# Patient Record
Sex: FEMALE | Race: WHITE | NOT HISPANIC OR LATINO | Employment: UNEMPLOYED | URBAN - METROPOLITAN AREA
[De-identification: names, ages, dates, MRNs, and addresses within clinical notes are randomized per-mention and may not be internally consistent; named-entity substitution may affect disease eponyms.]

---

## 2017-02-22 ENCOUNTER — APPOINTMENT (OUTPATIENT)
Dept: WOMENS IMAGING | Facility: HOSPITAL | Age: 47
End: 2017-02-22

## 2017-02-22 PROCEDURE — 77067 SCR MAMMO BI INCL CAD: CPT | Performed by: RADIOLOGY

## 2017-02-22 PROCEDURE — 77063 BREAST TOMOSYNTHESIS BI: CPT | Performed by: RADIOLOGY

## 2018-02-26 ENCOUNTER — APPOINTMENT (OUTPATIENT)
Dept: WOMENS IMAGING | Facility: HOSPITAL | Age: 48
End: 2018-02-26

## 2018-02-26 PROCEDURE — 77063 BREAST TOMOSYNTHESIS BI: CPT | Performed by: RADIOLOGY

## 2018-02-26 PROCEDURE — 77067 SCR MAMMO BI INCL CAD: CPT | Performed by: RADIOLOGY

## 2018-06-20 ENCOUNTER — APPOINTMENT (OUTPATIENT)
Dept: PREADMISSION TESTING | Facility: HOSPITAL | Age: 48
End: 2018-06-20

## 2018-06-20 VITALS
HEIGHT: 68 IN | OXYGEN SATURATION: 100 % | SYSTOLIC BLOOD PRESSURE: 117 MMHG | TEMPERATURE: 98.1 F | BODY MASS INDEX: 28.87 KG/M2 | RESPIRATION RATE: 20 BRPM | HEART RATE: 82 BPM | DIASTOLIC BLOOD PRESSURE: 78 MMHG | WEIGHT: 190.5 LBS

## 2018-06-20 LAB
ABO GROUP BLD: NORMAL
ANION GAP SERPL CALCULATED.3IONS-SCNC: 12.5 MMOL/L
BASOPHILS # BLD AUTO: 0.04 10*3/MM3 (ref 0–0.2)
BASOPHILS NFR BLD AUTO: 0.6 % (ref 0–1.5)
BLD GP AB SCN SERPL QL: NEGATIVE
BUN BLD-MCNC: 14 MG/DL (ref 6–20)
BUN/CREAT SERPL: 16.5 (ref 7–25)
CALCIUM SPEC-SCNC: 9.3 MG/DL (ref 8.6–10.5)
CHLORIDE SERPL-SCNC: 103 MMOL/L (ref 98–107)
CO2 SERPL-SCNC: 25.5 MMOL/L (ref 22–29)
CREAT BLD-MCNC: 0.85 MG/DL (ref 0.57–1)
DEPRECATED RDW RBC AUTO: 45 FL (ref 37–54)
EOSINOPHIL # BLD AUTO: 0.13 10*3/MM3 (ref 0–0.7)
EOSINOPHIL NFR BLD AUTO: 2 % (ref 0.3–6.2)
ERYTHROCYTE [DISTWIDTH] IN BLOOD BY AUTOMATED COUNT: 12.4 % (ref 11.7–13)
GFR SERPL CREATININE-BSD FRML MDRD: 71 ML/MIN/1.73
GLUCOSE BLD-MCNC: 77 MG/DL (ref 65–99)
HCG SERPL QL: NEGATIVE
HCT VFR BLD AUTO: 43.1 % (ref 35.6–45.5)
HGB BLD-MCNC: 13.7 G/DL (ref 11.9–15.5)
IMM GRANULOCYTES # BLD: 0 10*3/MM3 (ref 0–0.03)
IMM GRANULOCYTES NFR BLD: 0 % (ref 0–0.5)
LYMPHOCYTES # BLD AUTO: 2.09 10*3/MM3 (ref 0.9–4.8)
LYMPHOCYTES NFR BLD AUTO: 32.3 % (ref 19.6–45.3)
MCH RBC QN AUTO: 31.4 PG (ref 26.9–32)
MCHC RBC AUTO-ENTMCNC: 31.8 G/DL (ref 32.4–36.3)
MCV RBC AUTO: 98.9 FL (ref 80.5–98.2)
MONOCYTES # BLD AUTO: 0.56 10*3/MM3 (ref 0.2–1.2)
MONOCYTES NFR BLD AUTO: 8.6 % (ref 5–12)
NEUTROPHILS # BLD AUTO: 3.66 10*3/MM3 (ref 1.9–8.1)
NEUTROPHILS NFR BLD AUTO: 56.5 % (ref 42.7–76)
PLATELET # BLD AUTO: 253 10*3/MM3 (ref 140–500)
PMV BLD AUTO: 11.2 FL (ref 6–12)
POTASSIUM BLD-SCNC: 4 MMOL/L (ref 3.5–5.2)
RBC # BLD AUTO: 4.36 10*6/MM3 (ref 3.9–5.2)
RH BLD: POSITIVE
SODIUM BLD-SCNC: 141 MMOL/L (ref 136–145)
T&S EXPIRATION DATE: NORMAL
WBC NRBC COR # BLD: 6.48 10*3/MM3 (ref 4.5–10.7)

## 2018-06-20 PROCEDURE — 36415 COLL VENOUS BLD VENIPUNCTURE: CPT

## 2018-06-20 PROCEDURE — 86900 BLOOD TYPING SEROLOGIC ABO: CPT | Performed by: OBSTETRICS & GYNECOLOGY

## 2018-06-20 PROCEDURE — 84703 CHORIONIC GONADOTROPIN ASSAY: CPT | Performed by: OBSTETRICS & GYNECOLOGY

## 2018-06-20 PROCEDURE — 86850 RBC ANTIBODY SCREEN: CPT | Performed by: OBSTETRICS & GYNECOLOGY

## 2018-06-20 PROCEDURE — 80048 BASIC METABOLIC PNL TOTAL CA: CPT | Performed by: OBSTETRICS & GYNECOLOGY

## 2018-06-20 PROCEDURE — 86901 BLOOD TYPING SEROLOGIC RH(D): CPT | Performed by: OBSTETRICS & GYNECOLOGY

## 2018-06-20 PROCEDURE — 85025 COMPLETE CBC W/AUTO DIFF WBC: CPT | Performed by: OBSTETRICS & GYNECOLOGY

## 2018-06-20 RX ORDER — MULTIVITAMIN
1 CAPSULE ORAL DAILY
COMMUNITY

## 2018-06-20 NOTE — DISCHARGE INSTRUCTIONS
Take the following medications the morning of surgery with a small sip of water: none  STOP PREOP AS OF TODAY ANY ANTIINFLAMMATORY, HERBAL, VITAMIN, PROBIOTIC.  ARRIVE TO THE OUTPATIENT SURGERY DESK THE DAY OF YOUR SURGERY BY 11AM.      General Instructions:  • Do not eat or drink anything after midnight the night before surgery.  • Infants may have breast milk up to four hours before surgery.  • Infants drinking formula may drink formula up to six hours before surgery.   • Patients who avoid smoking, chewing tobacco and alcohol for 4 weeks prior to surgery have a reduced risk of post-operative complications.  Quit smoking as many days before surgery as you can.  • Do not smoke, use chewing tobacco or drink alcohol the day of surgery.   • If applicable bring your C-PAP/ BI-PAP machine.  • Bring any papers given to you in the doctor’s office.  • Wear clean comfortable clothes and socks.  • Do not wear contact lenses or make-up.  Bring a case for your glasses.   • Bring crutches or walker if applicable.  • Remove all piercings.  Leave jewelry and any other valuables at home.  • Hair extensions with metal clips must be removed prior to surgery.  • The Pre-Admission Testing nurse will instruct you to bring medications if unable to obtain an accurate list in Pre-Admission Testing.        If you were given a blood bank ID arm band remember to bring it with you the day of surgery.    Preventing a Surgical Site Infection:  • For 2 to 3 days before surgery, avoid shaving with a razor because the razor can irritate skin and make it easier to develop an infection.  • The night prior to surgery sleep in a clean bed with clean clothing.  Do not allow pets to sleep with you.  • Shower on the morning of surgery using a fresh bar of anti-bacterial soap (such as Dial) and clean washcloth.  Dry with a clean towel and dress in clean clothing.  • Ask your surgeon if you will be receiving antibiotics prior to surgery.  • Make sure you,  your family, and all healthcare providers clean their hands with soap and water or an alcohol based hand  before caring for you or your wound.    Day of surgery:  Upon arrival, a Pre-op nurse and Anesthesiologist will review your health history, obtain vital signs, and answer questions you may have.  The only belongings needed at this time will be your home medications and if applicable your C-PAP/BI-PAP machine.  If you are staying overnight your family can leave the rest of your belongings in the car and bring them to your room later.  A Pre-op nurse will start an IV and you may receive medication in preparation for surgery, including something to help you relax.  Your family will be able to see you in the Pre-op area.  While you are in surgery your family should notify the waiting room  if they leave the waiting room area and provide a contact phone number.    Please be aware that surgery does come with discomfort.  We want to make every effort to control your discomfort so please discuss any uncontrolled symptoms with your nurse.   Your doctor will most likely have prescribed pain medications.      If you are going home after surgery you will receive individualized written care instructions before being discharged.  A responsible adult must drive you to and from the hospital on the day of your surgery and stay with you for 24 hours.    If you are staying overnight following surgery, you will be transported to your hospital room following the recovery period.  [facility] has all private rooms.    If you have any questions please call Pre-Admission Testing at [phone].  Deductibles and co-payments are collected on the day of service. Please be prepared to pay the required co-pay, deductible or deposit on the day of service as defined by your plan.

## 2018-06-26 ENCOUNTER — ANESTHESIA EVENT (OUTPATIENT)
Dept: PERIOP | Facility: HOSPITAL | Age: 48
End: 2018-06-26

## 2018-06-26 ENCOUNTER — ANESTHESIA (OUTPATIENT)
Dept: PERIOP | Facility: HOSPITAL | Age: 48
End: 2018-06-26

## 2018-06-26 ENCOUNTER — HOSPITAL ENCOUNTER (OUTPATIENT)
Facility: HOSPITAL | Age: 48
Discharge: HOME | End: 2018-06-27
Attending: OBSTETRICS & GYNECOLOGY | Admitting: OBSTETRICS & GYNECOLOGY

## 2018-06-26 DIAGNOSIS — D26.9 UTERINE ADENOMYOMA: ICD-10-CM

## 2018-06-26 PROBLEM — N80.03 UTERUS, ADENOMYOSIS: Status: ACTIVE | Noted: 2018-06-26

## 2018-06-26 PROBLEM — N80.00 UTERUS, ADENOMYOSIS: Status: ACTIVE | Noted: 2018-06-26

## 2018-06-26 PROCEDURE — 25010000003 CEFAZOLIN IN DEXTROSE 2-4 GM/100ML-% SOLUTION: Performed by: OBSTETRICS & GYNECOLOGY

## 2018-06-26 PROCEDURE — 25010000002 MIDAZOLAM PER 1 MG: Performed by: ANESTHESIOLOGY

## 2018-06-26 PROCEDURE — 25010000002 FENTANYL CITRATE (PF) 100 MCG/2ML SOLUTION: Performed by: NURSE ANESTHETIST, CERTIFIED REGISTERED

## 2018-06-26 PROCEDURE — G0378 HOSPITAL OBSERVATION PER HR: HCPCS

## 2018-06-26 PROCEDURE — 25010000002 SUCCINYLCHOLINE PER 20 MG: Performed by: NURSE ANESTHETIST, CERTIFIED REGISTERED

## 2018-06-26 PROCEDURE — 88307 TISSUE EXAM BY PATHOLOGIST: CPT | Performed by: OBSTETRICS & GYNECOLOGY

## 2018-06-26 PROCEDURE — 25010000002 DEXAMETHASONE PER 1 MG: Performed by: NURSE ANESTHETIST, CERTIFIED REGISTERED

## 2018-06-26 PROCEDURE — 25010000002 HYDROMORPHONE PER 4 MG: Performed by: NURSE ANESTHETIST, CERTIFIED REGISTERED

## 2018-06-26 PROCEDURE — 25010000002 EPINEPHRINE PER 0.1 MG: Performed by: OBSTETRICS & GYNECOLOGY

## 2018-06-26 PROCEDURE — 94799 UNLISTED PULMONARY SVC/PX: CPT

## 2018-06-26 PROCEDURE — 25010000002 ROPIVACAINE PER 1 MG: Performed by: OBSTETRICS & GYNECOLOGY

## 2018-06-26 PROCEDURE — 25010000002 PROPOFOL 10 MG/ML EMULSION: Performed by: NURSE ANESTHETIST, CERTIFIED REGISTERED

## 2018-06-26 PROCEDURE — 25010000002 ONDANSETRON PER 1 MG: Performed by: NURSE ANESTHETIST, CERTIFIED REGISTERED

## 2018-06-26 RX ORDER — ALBUTEROL SULFATE 2.5 MG/3ML
2.5 SOLUTION RESPIRATORY (INHALATION) ONCE AS NEEDED
Status: DISCONTINUED | OUTPATIENT
Start: 2018-06-26 | End: 2018-06-26 | Stop reason: HOSPADM

## 2018-06-26 RX ORDER — HYDROCODONE BITARTRATE AND ACETAMINOPHEN 7.5; 325 MG/1; MG/1
1 TABLET ORAL ONCE AS NEEDED
Status: DISCONTINUED | OUTPATIENT
Start: 2018-06-26 | End: 2018-06-26 | Stop reason: HOSPADM

## 2018-06-26 RX ORDER — ONDANSETRON 2 MG/ML
INJECTION INTRAMUSCULAR; INTRAVENOUS AS NEEDED
Status: DISCONTINUED | OUTPATIENT
Start: 2018-06-26 | End: 2018-06-26 | Stop reason: SURG

## 2018-06-26 RX ORDER — OXYCODONE AND ACETAMINOPHEN 7.5; 325 MG/1; MG/1
1 TABLET ORAL ONCE AS NEEDED
Status: DISCONTINUED | OUTPATIENT
Start: 2018-06-26 | End: 2018-06-26 | Stop reason: HOSPADM

## 2018-06-26 RX ORDER — MAGNESIUM HYDROXIDE 1200 MG/15ML
LIQUID ORAL AS NEEDED
Status: DISCONTINUED | OUTPATIENT
Start: 2018-06-26 | End: 2018-06-26 | Stop reason: HOSPADM

## 2018-06-26 RX ORDER — DEXAMETHASONE SODIUM PHOSPHATE 10 MG/ML
INJECTION INTRAMUSCULAR; INTRAVENOUS AS NEEDED
Status: DISCONTINUED | OUTPATIENT
Start: 2018-06-26 | End: 2018-06-26 | Stop reason: SURG

## 2018-06-26 RX ORDER — SCOLOPAMINE TRANSDERMAL SYSTEM 1 MG/1
1 PATCH, EXTENDED RELEASE TRANSDERMAL
Status: DISCONTINUED | OUTPATIENT
Start: 2018-06-26 | End: 2018-06-26 | Stop reason: HOSPADM

## 2018-06-26 RX ORDER — FENTANYL CITRATE 50 UG/ML
INJECTION, SOLUTION INTRAMUSCULAR; INTRAVENOUS AS NEEDED
Status: DISCONTINUED | OUTPATIENT
Start: 2018-06-26 | End: 2018-06-26 | Stop reason: SURG

## 2018-06-26 RX ORDER — DIPHENHYDRAMINE HYDROCHLORIDE 50 MG/ML
12.5 INJECTION INTRAMUSCULAR; INTRAVENOUS
Status: DISCONTINUED | OUTPATIENT
Start: 2018-06-26 | End: 2018-06-26 | Stop reason: HOSPADM

## 2018-06-26 RX ORDER — PROMETHAZINE HYDROCHLORIDE 25 MG/ML
5 INJECTION, SOLUTION INTRAMUSCULAR; INTRAVENOUS
Status: DISCONTINUED | OUTPATIENT
Start: 2018-06-26 | End: 2018-06-26 | Stop reason: HOSPADM

## 2018-06-26 RX ORDER — FAMOTIDINE 10 MG/ML
20 INJECTION, SOLUTION INTRAVENOUS ONCE
Status: COMPLETED | OUTPATIENT
Start: 2018-06-26 | End: 2018-06-26

## 2018-06-26 RX ORDER — ROCURONIUM BROMIDE 10 MG/ML
INJECTION, SOLUTION INTRAVENOUS AS NEEDED
Status: DISCONTINUED | OUTPATIENT
Start: 2018-06-26 | End: 2018-06-26 | Stop reason: SURG

## 2018-06-26 RX ORDER — SODIUM CHLORIDE, SODIUM LACTATE, POTASSIUM CHLORIDE, CALCIUM CHLORIDE 600; 310; 30; 20 MG/100ML; MG/100ML; MG/100ML; MG/100ML
9 INJECTION, SOLUTION INTRAVENOUS CONTINUOUS
Status: DISCONTINUED | OUTPATIENT
Start: 2018-06-26 | End: 2018-06-26

## 2018-06-26 RX ORDER — SUCCINYLCHOLINE CHLORIDE 20 MG/ML
INJECTION INTRAMUSCULAR; INTRAVENOUS AS NEEDED
Status: DISCONTINUED | OUTPATIENT
Start: 2018-06-26 | End: 2018-06-26 | Stop reason: SURG

## 2018-06-26 RX ORDER — FLUMAZENIL 0.1 MG/ML
0.2 INJECTION INTRAVENOUS AS NEEDED
Status: DISCONTINUED | OUTPATIENT
Start: 2018-06-26 | End: 2018-06-26 | Stop reason: HOSPADM

## 2018-06-26 RX ORDER — ONDANSETRON 4 MG/1
4 TABLET, FILM COATED ORAL EVERY 6 HOURS PRN
Status: DISCONTINUED | OUTPATIENT
Start: 2018-06-26 | End: 2018-06-27 | Stop reason: HOSPADM

## 2018-06-26 RX ORDER — MIDAZOLAM HYDROCHLORIDE 1 MG/ML
1 INJECTION INTRAMUSCULAR; INTRAVENOUS
Status: DISCONTINUED | OUTPATIENT
Start: 2018-06-26 | End: 2018-06-26 | Stop reason: HOSPADM

## 2018-06-26 RX ORDER — LABETALOL HYDROCHLORIDE 5 MG/ML
5 INJECTION, SOLUTION INTRAVENOUS
Status: DISCONTINUED | OUTPATIENT
Start: 2018-06-26 | End: 2018-06-26 | Stop reason: HOSPADM

## 2018-06-26 RX ORDER — PROPOFOL 10 MG/ML
VIAL (ML) INTRAVENOUS AS NEEDED
Status: DISCONTINUED | OUTPATIENT
Start: 2018-06-26 | End: 2018-06-26 | Stop reason: SURG

## 2018-06-26 RX ORDER — SODIUM CHLORIDE, SODIUM LACTATE, POTASSIUM CHLORIDE, CALCIUM CHLORIDE 600; 310; 30; 20 MG/100ML; MG/100ML; MG/100ML; MG/100ML
100 INJECTION, SOLUTION INTRAVENOUS CONTINUOUS
Status: DISCONTINUED | OUTPATIENT
Start: 2018-06-26 | End: 2018-06-27 | Stop reason: HOSPADM

## 2018-06-26 RX ORDER — DOCUSATE SODIUM 100 MG/1
100 CAPSULE, LIQUID FILLED ORAL 2 TIMES DAILY PRN
Status: DISCONTINUED | OUTPATIENT
Start: 2018-06-26 | End: 2018-06-27 | Stop reason: HOSPADM

## 2018-06-26 RX ORDER — MIDAZOLAM HYDROCHLORIDE 1 MG/ML
2 INJECTION INTRAMUSCULAR; INTRAVENOUS
Status: DISCONTINUED | OUTPATIENT
Start: 2018-06-26 | End: 2018-06-26 | Stop reason: HOSPADM

## 2018-06-26 RX ORDER — ONDANSETRON 2 MG/ML
4 INJECTION INTRAMUSCULAR; INTRAVENOUS EVERY 6 HOURS PRN
Status: DISCONTINUED | OUTPATIENT
Start: 2018-06-26 | End: 2018-06-27 | Stop reason: HOSPADM

## 2018-06-26 RX ORDER — PROMETHAZINE HYDROCHLORIDE 25 MG/1
12.5 TABLET ORAL ONCE AS NEEDED
Status: DISCONTINUED | OUTPATIENT
Start: 2018-06-26 | End: 2018-06-26 | Stop reason: HOSPADM

## 2018-06-26 RX ORDER — ZOLPIDEM TARTRATE 5 MG/1
5 TABLET ORAL NIGHTLY PRN
Status: DISCONTINUED | OUTPATIENT
Start: 2018-06-26 | End: 2018-06-27 | Stop reason: HOSPADM

## 2018-06-26 RX ORDER — ONDANSETRON 2 MG/ML
4 INJECTION INTRAMUSCULAR; INTRAVENOUS ONCE AS NEEDED
Status: DISCONTINUED | OUTPATIENT
Start: 2018-06-26 | End: 2018-06-26 | Stop reason: HOSPADM

## 2018-06-26 RX ORDER — NALOXONE HCL 0.4 MG/ML
0.2 VIAL (ML) INJECTION AS NEEDED
Status: DISCONTINUED | OUTPATIENT
Start: 2018-06-26 | End: 2018-06-26 | Stop reason: HOSPADM

## 2018-06-26 RX ORDER — PROMETHAZINE HYDROCHLORIDE 25 MG/1
25 TABLET ORAL ONCE AS NEEDED
Status: DISCONTINUED | OUTPATIENT
Start: 2018-06-26 | End: 2018-06-26 | Stop reason: HOSPADM

## 2018-06-26 RX ORDER — DEXTROSE AND SODIUM CHLORIDE 5; .45 G/100ML; G/100ML
125 INJECTION, SOLUTION INTRAVENOUS CONTINUOUS
Status: DISCONTINUED | OUTPATIENT
Start: 2018-06-26 | End: 2018-06-27 | Stop reason: HOSPADM

## 2018-06-26 RX ORDER — CEFAZOLIN SODIUM 2 G/100ML
2 INJECTION, SOLUTION INTRAVENOUS EVERY 8 HOURS
Status: COMPLETED | OUTPATIENT
Start: 2018-06-26 | End: 2018-06-27

## 2018-06-26 RX ORDER — FENTANYL CITRATE 50 UG/ML
50 INJECTION, SOLUTION INTRAMUSCULAR; INTRAVENOUS
Status: DISCONTINUED | OUTPATIENT
Start: 2018-06-26 | End: 2018-06-26 | Stop reason: HOSPADM

## 2018-06-26 RX ORDER — HYDROMORPHONE HYDROCHLORIDE 1 MG/ML
0.5 INJECTION, SOLUTION INTRAMUSCULAR; INTRAVENOUS; SUBCUTANEOUS
Status: DISCONTINUED | OUTPATIENT
Start: 2018-06-26 | End: 2018-06-26 | Stop reason: HOSPADM

## 2018-06-26 RX ORDER — ONDANSETRON 4 MG/1
4 TABLET, ORALLY DISINTEGRATING ORAL EVERY 6 HOURS PRN
Status: DISCONTINUED | OUTPATIENT
Start: 2018-06-26 | End: 2018-06-27 | Stop reason: HOSPADM

## 2018-06-26 RX ORDER — LIDOCAINE HYDROCHLORIDE 20 MG/ML
INJECTION, SOLUTION INFILTRATION; PERINEURAL AS NEEDED
Status: DISCONTINUED | OUTPATIENT
Start: 2018-06-26 | End: 2018-06-26 | Stop reason: SURG

## 2018-06-26 RX ORDER — OXYCODONE AND ACETAMINOPHEN 7.5; 325 MG/1; MG/1
2 TABLET ORAL EVERY 4 HOURS PRN
Status: DISCONTINUED | OUTPATIENT
Start: 2018-06-26 | End: 2018-06-27 | Stop reason: HOSPADM

## 2018-06-26 RX ORDER — CEFAZOLIN SODIUM 2 G/100ML
2 INJECTION, SOLUTION INTRAVENOUS ONCE
Status: COMPLETED | OUTPATIENT
Start: 2018-06-26 | End: 2018-06-26

## 2018-06-26 RX ORDER — PROMETHAZINE HYDROCHLORIDE 25 MG/1
25 SUPPOSITORY RECTAL ONCE AS NEEDED
Status: DISCONTINUED | OUTPATIENT
Start: 2018-06-26 | End: 2018-06-26 | Stop reason: HOSPADM

## 2018-06-26 RX ORDER — HYDROCODONE BITARTRATE AND ACETAMINOPHEN 10; 325 MG/1; MG/1
1 TABLET ORAL EVERY 4 HOURS PRN
Status: DISCONTINUED | OUTPATIENT
Start: 2018-06-26 | End: 2018-06-27 | Stop reason: HOSPADM

## 2018-06-26 RX ORDER — GLYCOPYRROLATE 0.2 MG/ML
INJECTION INTRAMUSCULAR; INTRAVENOUS AS NEEDED
Status: DISCONTINUED | OUTPATIENT
Start: 2018-06-26 | End: 2018-06-26 | Stop reason: SURG

## 2018-06-26 RX ORDER — HYDRALAZINE HYDROCHLORIDE 20 MG/ML
5 INJECTION INTRAMUSCULAR; INTRAVENOUS
Status: DISCONTINUED | OUTPATIENT
Start: 2018-06-26 | End: 2018-06-26 | Stop reason: HOSPADM

## 2018-06-26 RX ORDER — SODIUM CHLORIDE 0.9 % (FLUSH) 0.9 %
1-10 SYRINGE (ML) INJECTION AS NEEDED
Status: DISCONTINUED | OUTPATIENT
Start: 2018-06-26 | End: 2018-06-26 | Stop reason: HOSPADM

## 2018-06-26 RX ORDER — LIDOCAINE HYDROCHLORIDE 10 MG/ML
0.5 INJECTION, SOLUTION EPIDURAL; INFILTRATION; INTRACAUDAL; PERINEURAL ONCE AS NEEDED
Status: COMPLETED | OUTPATIENT
Start: 2018-06-26 | End: 2018-06-26

## 2018-06-26 RX ORDER — PROMETHAZINE HYDROCHLORIDE 25 MG/ML
12.5 INJECTION, SOLUTION INTRAMUSCULAR; INTRAVENOUS ONCE AS NEEDED
Status: DISCONTINUED | OUTPATIENT
Start: 2018-06-26 | End: 2018-06-26 | Stop reason: HOSPADM

## 2018-06-26 RX ORDER — EPHEDRINE SULFATE 50 MG/ML
5 INJECTION, SOLUTION INTRAVENOUS ONCE AS NEEDED
Status: DISCONTINUED | OUTPATIENT
Start: 2018-06-26 | End: 2018-06-26 | Stop reason: HOSPADM

## 2018-06-26 RX ORDER — NALOXONE HCL 0.4 MG/ML
0.1 VIAL (ML) INJECTION
Status: DISCONTINUED | OUTPATIENT
Start: 2018-06-26 | End: 2018-06-27 | Stop reason: HOSPADM

## 2018-06-26 RX ADMIN — LIDOCAINE HYDROCHLORIDE 60 MG: 20 INJECTION, SOLUTION INFILTRATION; PERINEURAL at 12:58

## 2018-06-26 RX ADMIN — PROPOFOL 200 MG: 10 INJECTION, EMULSION INTRAVENOUS at 12:59

## 2018-06-26 RX ADMIN — FENTANYL CITRATE 50 MCG: 50 INJECTION, SOLUTION INTRAMUSCULAR; INTRAVENOUS at 14:30

## 2018-06-26 RX ADMIN — LIDOCAINE HYDROCHLORIDE 0.5 ML: 10 INJECTION, SOLUTION EPIDURAL; INFILTRATION; INTRACAUDAL; PERINEURAL at 11:46

## 2018-06-26 RX ADMIN — CEFAZOLIN SODIUM 2 G: 2 INJECTION, SOLUTION INTRAVENOUS at 12:54

## 2018-06-26 RX ADMIN — ONDANSETRON 4 MG: 2 INJECTION INTRAMUSCULAR; INTRAVENOUS at 14:24

## 2018-06-26 RX ADMIN — MIDAZOLAM 2 MG: 1 INJECTION INTRAMUSCULAR; INTRAVENOUS at 12:49

## 2018-06-26 RX ADMIN — FENTANYL CITRATE 50 MCG: 50 INJECTION, SOLUTION INTRAMUSCULAR; INTRAVENOUS at 13:01

## 2018-06-26 RX ADMIN — HYDROMORPHONE HYDROCHLORIDE 0.5 MG: 1 INJECTION, SOLUTION INTRAMUSCULAR; INTRAVENOUS; SUBCUTANEOUS at 15:50

## 2018-06-26 RX ADMIN — SUGAMMADEX 2 ML: 100 INJECTION, SOLUTION INTRAVENOUS at 14:24

## 2018-06-26 RX ADMIN — ROCURONIUM BROMIDE 15 MG: 10 INJECTION INTRAVENOUS at 13:07

## 2018-06-26 RX ADMIN — DEXTROSE AND SODIUM CHLORIDE 125 ML/HR: 5; 450 INJECTION, SOLUTION INTRAVENOUS at 18:14

## 2018-06-26 RX ADMIN — ONDANSETRON 4 MG: 4 TABLET, FILM COATED ORAL at 20:48

## 2018-06-26 RX ADMIN — FENTANYL CITRATE 50 MCG: 50 INJECTION, SOLUTION INTRAMUSCULAR; INTRAVENOUS at 14:36

## 2018-06-26 RX ADMIN — HYDROCODONE BITARTRATE AND ACETAMINOPHEN 1 TABLET: 10; 325 TABLET ORAL at 20:48

## 2018-06-26 RX ADMIN — FENTANYL CITRATE 50 MCG: 50 INJECTION, SOLUTION INTRAMUSCULAR; INTRAVENOUS at 15:32

## 2018-06-26 RX ADMIN — FAMOTIDINE 20 MG: 10 INJECTION, SOLUTION INTRAVENOUS at 12:49

## 2018-06-26 RX ADMIN — FENTANYL CITRATE 50 MCG: 50 INJECTION, SOLUTION INTRAMUSCULAR; INTRAVENOUS at 13:30

## 2018-06-26 RX ADMIN — DEXAMETHASONE SODIUM PHOSPHATE 6 MG: 10 INJECTION INTRAMUSCULAR; INTRAVENOUS at 13:07

## 2018-06-26 RX ADMIN — FENTANYL CITRATE 50 MCG: 50 INJECTION, SOLUTION INTRAMUSCULAR; INTRAVENOUS at 12:54

## 2018-06-26 RX ADMIN — SODIUM CHLORIDE, POTASSIUM CHLORIDE, SODIUM LACTATE AND CALCIUM CHLORIDE 9 ML/HR: 600; 310; 30; 20 INJECTION, SOLUTION INTRAVENOUS at 11:46

## 2018-06-26 RX ADMIN — GLYCOPYRROLATE 0.2 MG: 0.2 INJECTION INTRAMUSCULAR; INTRAVENOUS at 13:07

## 2018-06-26 RX ADMIN — SCOPOLAMINE 1 PATCH: 1 PATCH, EXTENDED RELEASE TRANSDERMAL at 12:49

## 2018-06-26 RX ADMIN — ROCURONIUM BROMIDE 5 MG: 10 INJECTION INTRAVENOUS at 12:58

## 2018-06-26 RX ADMIN — SODIUM CHLORIDE, POTASSIUM CHLORIDE, SODIUM LACTATE AND CALCIUM CHLORIDE: 600; 310; 30; 20 INJECTION, SOLUTION INTRAVENOUS at 14:37

## 2018-06-26 RX ADMIN — SUCCINYLCHOLINE CHLORIDE 100 MG: 20 INJECTION, SOLUTION INTRAMUSCULAR; INTRAVENOUS; PARENTERAL at 12:59

## 2018-06-26 RX ADMIN — FENTANYL CITRATE 50 MCG: 50 INJECTION, SOLUTION INTRAMUSCULAR; INTRAVENOUS at 15:18

## 2018-06-26 RX ADMIN — CEFAZOLIN SODIUM 2 G: 2 INJECTION, SOLUTION INTRAVENOUS at 20:23

## 2018-06-26 RX ADMIN — HYDROCODONE BITARTRATE AND ACETAMINOPHEN 1 TABLET: 10; 325 TABLET ORAL at 16:57

## 2018-06-26 NOTE — ANESTHESIA POSTPROCEDURE EVALUATION
Patient: Celsa Tom    Procedure Summary     Date:  06/26/18 Room / Location:   [facility]  /  [facility]    Anesthesia Start:  1252 Anesthesia Stop:  1453    Procedure:  TOTAL LAPAROSCOPIC HYSTERECTOMY BILATERAL SALPINGECTOMY (Bilateral Abdomen) Diagnosis:      Surgeon:  Nelia Olea MD Provider:  Sherwin Roman MD    Anesthesia Type:  general ASA Status:  2          Anesthesia Type: general  Last vitals  BP   126/67 (06/26/18 1550)   Temp   36.7 °C (98 °F) (06/26/18 1550)   Pulse   98 (06/26/18 1550)   Resp   16 (06/26/18 1550)     SpO2   100 % (06/26/18 1550)     Post Anesthesia Care and Evaluation    Patient location during evaluation: PACU  Patient participation: complete - patient participated  Level of consciousness: awake and alert  Pain management: adequate  Airway patency: patent  Anesthetic complications: No anesthetic complications    Cardiovascular status: acceptable  Respiratory status: acceptable  Hydration status: acceptable    Comments: -------------------------              06/26/18                    1550        -------------------------   BP:         126/67        Pulse:        98          Resp:         16          Temp:   36.7 °C (98 °F)   SpO2:        100%        -------------------------

## 2018-06-26 NOTE — ANESTHESIA PROCEDURE NOTES
Airway  Urgency: elective    Airway not difficult    General Information and Staff    Patient location during procedure: OR  Anesthesiologist: CHIP ARVIZU  CRNA: CAMERON BUSTILLO    Indications and Patient Condition  Indications for airway management: airway protection    Preoxygenated: yes  MILS maintained throughout  Mask difficulty assessment: 1 - vent by mask    Final Airway Details  Final airway type: endotracheal airway      Successful airway: ETT  Cuffed: yes   Successful intubation technique: direct laryngoscopy  Facilitating devices/methods: intubating stylet  Endotracheal tube insertion site: oral  Blade: Bill  Blade size: #3  ETT size: 7.0 mm  Cormack-Lehane Classification: grade I - full view of glottis  Placement verified by: chest auscultation and capnometry   Cuff volume (mL): 3  Measured from: lips  ETT to lips (cm): 21  Number of attempts at approach: 1    Additional Comments  Atraumatic ET Tube placement.  Teeth as pre-op. BLEBS.  -ABD sounds.  +ET CO2.  Secured to face

## 2018-06-26 NOTE — ANESTHESIA PREPROCEDURE EVALUATION
Anesthesia Evaluation     Patient summary reviewed and Nursing notes reviewed   history of anesthetic complications: PONV               Airway   Mallampati: II  Dental      Pulmonary - negative pulmonary ROS   Cardiovascular - negative cardio ROS        Neuro/Psych- negative ROS  GI/Hepatic/Renal/Endo - negative ROS     Musculoskeletal (-) negative ROS    Abdominal    Substance History - negative use     OB/GYN negative ob/gyn ROS         Other                        Anesthesia Plan    ASA 2     general     intravenous induction   Anesthetic plan and risks discussed with patient.

## 2018-06-27 VITALS
HEART RATE: 61 BPM | OXYGEN SATURATION: 100 % | WEIGHT: 190 LBS | TEMPERATURE: 97.1 F | BODY MASS INDEX: 28.79 KG/M2 | SYSTOLIC BLOOD PRESSURE: 88 MMHG | HEIGHT: 68 IN | RESPIRATION RATE: 16 BRPM | DIASTOLIC BLOOD PRESSURE: 55 MMHG

## 2018-06-27 PROBLEM — N80.03 UTERUS, ADENOMYOSIS: Status: RESOLVED | Noted: 2018-06-26 | Resolved: 2018-06-27

## 2018-06-27 PROBLEM — D26.9 UTERINE ADENOMYOMA: Status: ACTIVE | Noted: 2018-06-27

## 2018-06-27 PROBLEM — N80.00 UTERUS, ADENOMYOSIS: Status: RESOLVED | Noted: 2018-06-26 | Resolved: 2018-06-27

## 2018-06-27 LAB
CYTO UR: NORMAL
DEPRECATED RDW RBC AUTO: 44.5 FL (ref 37–54)
ERYTHROCYTE [DISTWIDTH] IN BLOOD BY AUTOMATED COUNT: 12.3 % (ref 11.7–13)
HCT VFR BLD AUTO: 39.1 % (ref 35.6–45.5)
HGB BLD-MCNC: 12.5 G/DL (ref 11.9–15.5)
LAB AP CASE REPORT: NORMAL
MCH RBC QN AUTO: 31.4 PG (ref 26.9–32)
MCHC RBC AUTO-ENTMCNC: 32 G/DL (ref 32.4–36.3)
MCV RBC AUTO: 98.2 FL (ref 80.5–98.2)
PATH REPORT.FINAL DX SPEC: NORMAL
PATH REPORT.GROSS SPEC: NORMAL
PLATELET # BLD AUTO: 234 10*3/MM3 (ref 140–500)
PMV BLD AUTO: 10.7 FL (ref 6–12)
RBC # BLD AUTO: 3.98 10*6/MM3 (ref 3.9–5.2)
WBC NRBC COR # BLD: 12.36 10*3/MM3 (ref 4.5–10.7)

## 2018-06-27 PROCEDURE — 85027 COMPLETE CBC AUTOMATED: CPT | Performed by: OBSTETRICS & GYNECOLOGY

## 2018-06-27 PROCEDURE — 25010000003 CEFAZOLIN IN DEXTROSE 2-4 GM/100ML-% SOLUTION: Performed by: OBSTETRICS & GYNECOLOGY

## 2018-06-27 RX ADMIN — HYDROCODONE BITARTRATE AND ACETAMINOPHEN 1 TABLET: 10; 325 TABLET ORAL at 11:47

## 2018-06-27 RX ADMIN — CEFAZOLIN SODIUM 2 G: 2 INJECTION, SOLUTION INTRAVENOUS at 05:44

## 2018-06-27 RX ADMIN — HYDROCODONE BITARTRATE AND ACETAMINOPHEN 1 TABLET: 10; 325 TABLET ORAL at 05:46

## 2018-06-27 RX ADMIN — DOCUSATE SODIUM 100 MG: 100 CAPSULE, LIQUID FILLED ORAL at 05:44

## 2018-06-27 RX ADMIN — DEXTROSE AND SODIUM CHLORIDE 125 ML/HR: 5; 450 INJECTION, SOLUTION INTRAVENOUS at 03:08

## 2018-06-27 RX ADMIN — HYDROCODONE BITARTRATE AND ACETAMINOPHEN 1 TABLET: 10; 325 TABLET ORAL at 01:33

## 2019-02-28 ENCOUNTER — APPOINTMENT (OUTPATIENT)
Dept: WOMENS IMAGING | Facility: HOSPITAL | Age: 49
End: 2019-02-28

## 2019-02-28 PROCEDURE — 77063 BREAST TOMOSYNTHESIS BI: CPT | Performed by: RADIOLOGY

## 2019-02-28 PROCEDURE — 77067 SCR MAMMO BI INCL CAD: CPT | Performed by: RADIOLOGY

## 2019-12-27 ENCOUNTER — OFFICE VISIT (OUTPATIENT)
Dept: ORTHOPEDIC SURGERY | Facility: CLINIC | Age: 49
End: 2019-12-27

## 2019-12-27 VITALS
DIASTOLIC BLOOD PRESSURE: 75 MMHG | BODY MASS INDEX: 29.03 KG/M2 | WEIGHT: 185 LBS | HEART RATE: 63 BPM | SYSTOLIC BLOOD PRESSURE: 111 MMHG | HEIGHT: 67 IN

## 2019-12-27 DIAGNOSIS — R52 PAIN: Primary | ICD-10-CM

## 2019-12-27 DIAGNOSIS — M17.0 OSTEOARTHRITIS OF PATELLOFEMORAL JOINTS OF BOTH KNEES: ICD-10-CM

## 2019-12-27 PROCEDURE — 73562 X-RAY EXAM OF KNEE 3: CPT | Performed by: NURSE PRACTITIONER

## 2019-12-27 PROCEDURE — 20610 DRAIN/INJ JOINT/BURSA W/O US: CPT | Performed by: NURSE PRACTITIONER

## 2019-12-27 PROCEDURE — 99203 OFFICE O/P NEW LOW 30 MIN: CPT | Performed by: NURSE PRACTITIONER

## 2019-12-27 RX ORDER — LANOLIN ALCOHOL/MO/W.PET/CERES
1000 CREAM (GRAM) TOPICAL DAILY
COMMUNITY
End: 2022-09-23

## 2019-12-27 RX ORDER — MELOXICAM 15 MG/1
15 TABLET ORAL DAILY
Qty: 30 TABLET | Refills: 2 | Status: SHIPPED | OUTPATIENT
Start: 2019-12-27 | End: 2020-05-05

## 2019-12-27 RX ORDER — TRIAMCINOLONE ACETONIDE 40 MG/ML
80 INJECTION, SUSPENSION INTRA-ARTICULAR; INTRAMUSCULAR
Status: COMPLETED | OUTPATIENT
Start: 2019-12-27 | End: 2019-12-27

## 2019-12-27 RX ORDER — CHLORAL HYDRATE 500 MG
CAPSULE ORAL
COMMUNITY

## 2019-12-27 RX ORDER — LIDOCAINE HYDROCHLORIDE 10 MG/ML
8 INJECTION, SOLUTION EPIDURAL; INFILTRATION; INTRACAUDAL; PERINEURAL
Status: COMPLETED | OUTPATIENT
Start: 2019-12-27 | End: 2019-12-27

## 2019-12-27 RX ADMIN — TRIAMCINOLONE ACETONIDE 80 MG: 40 INJECTION, SUSPENSION INTRA-ARTICULAR; INTRAMUSCULAR at 10:59

## 2019-12-27 RX ADMIN — LIDOCAINE HYDROCHLORIDE 8 ML: 10 INJECTION, SOLUTION EPIDURAL; INFILTRATION; INTRACAUDAL; PERINEURAL at 10:59

## 2019-12-27 NOTE — PROGRESS NOTES
Subjective:     Patient ID: Celsa Tom is a 49 y.o. female.    Chief Complaint:  Bilateral knee pain  History of Present Illness  Celsa Tom 49-year-old female presents to clinic for evaluation of bilateral knees.  Pain has been present on and off for the last 6 months right knee pain greater than that of the left at this time however she did stand up to stand up out of a chair approximately 2 days ago felt a pop at the anterior aspect of the knee and ever since has began noticing swelling and increased pain.  Maximal tenderness present bilateral knees at the anterior aspect increased pain noted with transitional activities such as from seated to standing attempting to walk.  She is able to proceed with other activities however does experience pain with transitional activities and kneeling.  Denies that the knees are locking, catching or giving way.  Denies any recent x-ray or MRI or CT of the knees.  She has been seen by her primary care provider was referred for physical therapy which she last attended in October was released with home strengthening exercises.  Denies any previous corticosteroid injection, Visco supplementation injections into the knees.  She is not currently taking any anti-inflammatory medication for symptom relief.  Rates discomfort at worst 7 to an 8 out of a 10 stabbing swelling in nature.  She has tried meloxicam for approximately 6 weeks which did seem to work however is not currently taking at this time.  Denies any previous bracing.  Pain is not radiating to the lateral aspects of the hip although has occurred on occasion at the right hip and denies pain radiating into bilateral groin.  Denies presence of numbness or tingling bilateral lower extremities at this time.  Denies all the concerns she has.       Social History     Occupational History   • Not on file   Tobacco Use   • Smoking status: Never Smoker   • Smokeless tobacco: Never Used   Substance and Sexual Activity   •  Alcohol use: Yes     Alcohol/week: 2.0 standard drinks     Types: 2 Glasses of wine per week   • Drug use: No   • Sexual activity: Defer     Birth control/protection: OCP      Past Medical History:   Diagnosis Date   • Ankle pain, left    • Heavy menstrual bleeding    • Migraine    • PONV (postoperative nausea and vomiting)    • Spinal headache    • Tendonitis     left ankle   • Wears contact lenses      Past Surgical History:   Procedure Laterality Date   •  SECTION      x2   • TOTAL LAPAROSCOPIC HYSTERECTOMY Bilateral 2018    Procedure: TOTAL LAPAROSCOPIC HYSTERECTOMY BILATERAL SALPINGECTOMY;  Surgeon: Nelia Olea MD;  [place] [facility] [place] [facility] [place]  Service: [place]necology   • TUBAL ABDOMINAL LIGATION         Family History   Problem Relation Age of Onset   • Malig Hyperthermia Neg Hx          Review of Systems   Constitutional: Negative for chills, diaphoresis, fever and unexpected weight change.   HENT: Negative for hearing loss, nosebleeds, sore throat and tinnitus.    Eyes: Negative for pain and visual disturbance.   Respiratory: Negative for cough, shortness of breath and wheezing.    Cardiovascular: Negative for chest pain and palpitations.   Gastrointestinal: Negative for abdominal pain, diarrhea, nausea and vomiting.   Endocrine: Negative for cold intolerance, heat intolerance and polydipsia.   Genitourinary: Negative for difficulty urinating, dysuria and hematuria.   Musculoskeletal: Positive for arthralgias and myalgias. Negative for joint swelling.   Skin: Negative for rash and wound.   Allergic/Immunologic: Negative for environmental allergies.   Neurological: Negative for dizziness, syncope and numbness.   Hematological: Does not bruise/bleed easily.   Psychiatric/Behavioral: Negative for dysphoric mood and sleep disturbance. The patient is not nervous/anxious.            Objective:  Physical Exam    Vital signs reviewed.   General: No acute distress.  Eyes: conjunctiva clear; pupils equally  "round and reactive  ENT: external ears and nose atraumatic; oropharynx clear  CV: no peripheral edema  Resp: normal respiratory effort  Skin: no rashes or wounds; normal turgor  Psych: mood and affect appropriate; recent and remote memory intact    Vitals:    12/27/19 1017   BP: 111/75   BP Location: Right arm   Patient Position: Sitting   Cuff Size: Adult   Pulse: 63   Weight: 83.9 kg (185 lb)   Height: 170.2 cm (67\")         12/27/19  1017   Weight: 83.9 kg (185 lb)     Body mass index is 28.98 kg/m².      Right Knee Exam     Tenderness   The patient is experiencing tenderness in the patella.    Range of Motion   Extension: 0   Flexion: 130     Tests   Mackenzie:  Medial - negative Lateral - negative  Varus: negative Valgus: negative  Lachman:  Anterior - 1+    Posterior - negative  Drawer:  Anterior - negative    Posterior - negative  Patellar apprehension: positive    Other   Erythema: absent  Sensation: normal  Pulse: present  Swelling: moderate  Effusion: effusion (minimal) present    Comments:  Positive crepitus throughout arc of motion  Positive active patellar compression test  Negative logroll exam  Negative Stinchfield exam      Left Knee Exam     Tenderness   The patient is experiencing tenderness in the patella.    Range of Motion   Extension: 0   Flexion: 130     Tests   Mackenzie:  Lateral - negative  Valgus: negative  Lachman:  Anterior - trace    Posterior - negative  Drawer:  Anterior - negative     Posterior - negative    Other   Erythema: absent  Sensation: normal  Pulse: present  Swelling: mild  Effusion: no effusion present    Comments:  Positive crepitus throughout arc of motion  Negative active patellar compression test  Negative logroll exam  Negative Stinchfield exam           Imaging:  Bilateral Knee X-Ray  Indication: Pain    AP, Lateral, and Kilauea views    Findings:  No fracture  Normal soft tissues  Mild to moderate tricompartmental osteoarthritis greatest at patellofemoral joint " bilaterally with osteophytes noted inferior and superior patellar poles bilaterally greater right than left    No prior studies were available for comparison.    Assessment:        1. Pain    2. Osteoarthritis of patellofemoral joints of both knees           Plan  1.  Discussed plan of care with patient.  Wish to proceed with corticosteroid injection right knee.  Plan to see her back in approximately 6 weeks to reevaluate.  I do recommend application of ice to the injection site however heat will be better for the osteoarthritic changes.  I do also recommend K tape with exercise and home strengthening exercises which she was previously provided with a physical therapy.  Discussed to avoid lunges, squats with activity at the gym, and encouraged walking flat surface such as a treadmill leg lift exercises for strengthening of the quads, hamstrings, calves, resistance strengthening exercises.  I do also recommend she avoid the elliptical machine.  She verbalized understanding of information agrees with plan of care.  Denies other concerns present this time.  :Large Joint Arthrocentesis: R knee  Date/Time: 12/27/2019 10:59 AM  Consent given by: patient  Site marked: site marked  Timeout: Immediately prior to procedure a time out was called to verify the correct patient, procedure, equipment, support staff and site/side marked as required   Supporting Documentation  Indications: pain   Procedure Details  Location: knee - R knee  Preparation: Patient was prepped and draped in the usual sterile fashion  Needle size: 22 G  Approach: superior  Medications administered: 8 mL lidocaine PF 1% 1 %; 80 mg triamcinolone acetonide 40 MG/ML  Patient tolerance: patient tolerated the procedure well with no immediate complications            Orders:  Orders Placed This Encounter   Procedures   • Large Joint Arthrocentesis: R knee   • XR Knee 3 View Bilateral         I ordered and reviewed the BELKYS today.     Dictated utilizing Huan  dictation

## 2020-02-10 ENCOUNTER — OFFICE VISIT (OUTPATIENT)
Dept: ORTHOPEDIC SURGERY | Facility: CLINIC | Age: 50
End: 2020-02-10

## 2020-02-10 DIAGNOSIS — M25.561 MECHANICAL KNEE PAIN, RIGHT: ICD-10-CM

## 2020-02-10 DIAGNOSIS — M17.0 OSTEOARTHRITIS OF PATELLOFEMORAL JOINTS OF BOTH KNEES: Primary | ICD-10-CM

## 2020-02-10 PROCEDURE — 99213 OFFICE O/P EST LOW 20 MIN: CPT | Performed by: NURSE PRACTITIONER

## 2020-02-10 RX ORDER — METHYLPREDNISOLONE 4 MG/1
TABLET ORAL
Qty: 21 TABLET | Refills: 0 | Status: SHIPPED | OUTPATIENT
Start: 2020-02-10 | End: 2020-02-18

## 2020-02-10 NOTE — PROGRESS NOTES
Subjective:     Patient ID: Celsa Tom is a 49 y.o. female.    Chief Complaint:  Follow-up patellofemoral osteoarthritis     History of Present Illness  Celsa Tom returns to clinic for follow-up right knee.  Received approximately 100% symptom relief with corticosteroid injection that she received last visit on 2/27/2019.  Over the last 1 week has began noticing return symptoms.  States last Monday she walked up steps and began noticing discomfort.  Approximately 4 days after that began noticing significant swelling just with ambulatory activities right knee.  Over the weekend she was able to rest apply ice and has continued taking meloxicam swelling has significantly decreased.  Continues to experience maximal tenderness lateral joint line.  Increased pain noted with all ambulatory activities, activities involving deep flexion, ascending spine steps.  Denies presence of numbness or tingling radiating down right lower extremity.  Pain is not radiating to her groin nor to the lateral aspect of her hip.  Rates discomfort a 7 to an 8 out of the 10 aching throbbing sharp in nature with activities involving deep flexion.  Positive for catching sensation when she stands and attempts to walk denies that the knee is giving out on her.  Denies that that she is experiencing locking sensation.  She has been unable to continue with exercise regimen secondary to the knee pain.  Continues to experience popping and grinding sensation in the anterior aspect of the knee.  Denies other concerns present this time.    Social History     Occupational History   • Not on file   Tobacco Use   • Smoking status: Never Smoker   • Smokeless tobacco: Never Used   Substance and Sexual Activity   • Alcohol use: Yes     Alcohol/week: 2.0 standard drinks     Types: 2 Glasses of wine per week   • Drug use: No   • Sexual activity: Defer     Birth control/protection: OCP      Past Medical History:   Diagnosis Date   • Ankle pain, left    •  Heavy menstrual bleeding    • Migraine    • PONV (postoperative nausea and vomiting)    • Spinal headache    • Tendonitis     left ankle   • Wears contact lenses      Past Surgical History:   Procedure Laterality Date   •  SECTION      x2   • TOTAL LAPAROSCOPIC HYSTERECTOMY Bilateral 2018    Procedure: TOTAL LAPAROSCOPIC HYSTERECTOMY BILATERAL SALPINGECTOMY;  Surgeon: Nelia Olea MD;  [facility] [place] [facility];  Service: Gynecology   • TUBAL ABDOMINAL LIGATION         Family History   Problem Relation Age of Onset   • Malig Hyperthermia Neg Hx          Review of Systems   Constitutional: Negative for chills, diaphoresis, fever and unexpected weight change.   HENT: Negative for hearing loss, nosebleeds, sore throat and tinnitus.    Eyes: Negative for pain and visual disturbance.   Respiratory: Negative for cough, shortness of breath and wheezing.    Cardiovascular: Negative for chest pain and palpitations.   Gastrointestinal: Negative for abdominal pain, diarrhea, nausea and vomiting.   Endocrine: Negative for cold intolerance, heat intolerance and polydipsia.   Genitourinary: Negative for difficulty urinating, dysuria and hematuria.   Musculoskeletal: Positive for arthralgias. Negative for joint swelling and myalgias.   Skin: Negative for rash and wound.   Allergic/Immunologic: Negative for environmental allergies.   Neurological: Negative for dizziness, syncope and numbness.   Hematological: Does not bruise/bleed easily.   Psychiatric/Behavioral: Negative for dysphoric mood and sleep disturbance. The patient is not nervous/anxious.    All other systems reviewed and are negative.          Objective:  Physical Exam    General: No acute distress.  Eyes: conjunctiva clear; pupils equally round and reactive  ENT: external ears and nose atraumatic; oropharynx clear  CV: no peripheral edema  Resp: normal respiratory effort  Skin: no rashes or wounds; normal turgor  Psych: mood and affect appropriate;  recent and remote memory intact    There were no vitals filed for this visit.  There were no vitals filed for this visit.  There is no height or weight on file to calculate BMI.      Right Knee Exam     Tenderness   The patient is experiencing tenderness in the lateral joint line.    Range of Motion   Extension: 0   Flexion: 120     Tests   Mackenzie:  Medial - negative Lateral - positive  Varus: negative Valgus: negative  Lachman:  Anterior - 1+    Posterior - negative  Drawer:  Anterior - negative    Posterior - negative  Patellar apprehension: positive    Other   Erythema: absent  Sensation: normal  Pulse: present  Swelling: moderate  Effusion: no effusion present    Comments:  Positive crepitus throughout arc of motion  Positive active patellar compression   Negative logroll exam   Negative stinchfield exam           Assessment:        1. Osteoarthritis of patellofemoral joints of both knees    2. Mechanical knee pain, right           Plan:  1.  Discussed plan of care with patient.  Wishes to proceed with oral Medrol pack discussed to discontinue meloxicam for the next 6 days when taking Medrol Dosepak.  We will proceed with MRI to evaluate for tear.  Plan to see her back in clinic after completion of testing to discuss results and further plan of care.  She verbalized understanding of all information agrees with plan of care.  Denies other concerns present this time.  Orders:  No orders of the defined types were placed in this encounter.    Dictated utilizing Dragon dictation

## 2020-02-18 ENCOUNTER — OFFICE VISIT (OUTPATIENT)
Dept: ORTHOPEDIC SURGERY | Facility: CLINIC | Age: 50
End: 2020-02-18

## 2020-02-18 DIAGNOSIS — M17.11 PRIMARY OSTEOARTHRITIS OF RIGHT KNEE: Primary | ICD-10-CM

## 2020-02-18 DIAGNOSIS — S83.281D TEAR OF LATERAL MENISCUS OF RIGHT KNEE, UNSPECIFIED TEAR TYPE, UNSPECIFIED WHETHER OLD OR CURRENT TEAR, SUBSEQUENT ENCOUNTER: ICD-10-CM

## 2020-02-18 PROBLEM — S83.281A TEAR OF LATERAL MENISCUS OF RIGHT KNEE: Status: ACTIVE | Noted: 2020-02-18

## 2020-02-18 PROCEDURE — 99213 OFFICE O/P EST LOW 20 MIN: CPT | Performed by: NURSE PRACTITIONER

## 2020-02-18 NOTE — PROGRESS NOTES
Subjective:     Patient ID: Celsa Tom is a 49 y.o. female.    Chief Complaint:  Follow-up primary osteoarthritis right knee   History of Present Illness  Celsa Tom returns to clinic for follow-up right knee.  Has completed MRI results and would like to discuss results.  She was last seen in clinic 2/10/2021 MRI was ordered.  Received previously corticosteroid injection 12/27/2019 with 100% symptom relief until approximately 2 to 3 weeks ago.  States she noticed pain when walking up steps discomfort followed by swelling with all ambulatory activities.  She was able to rest take meloxicam and swelling did significantly decrease.  Continues to experience maximal tenderness anterior aspect, patella and lateral joint line.Increased pain noted with all ambulatory activities, activities involving deep flexion, ascending steps.  She is been unable to exercise secondary to swelling pain she is been experiencing.Positive for catching sensation when she stands and attempts to walk denies that the knee is giving out on her.  Denies that that she is experiencing locking sensation.  Denies presence of numbness or tingling radiating down the right lower extremity.  Pain not radiating to her groin or to the lateral aspect of her hip.  Continues to rate discomfort 7-8 out of a 10 aching throbbing sharp in nature with activities involving deep flexion.  Denies other concerns present this time.     Social History     Occupational History   • Not on file   Tobacco Use   • Smoking status: Never Smoker   • Smokeless tobacco: Never Used   Substance and Sexual Activity   • Alcohol use: Yes     Alcohol/week: 2.0 standard drinks     Types: 2 Glasses of wine per week   • Drug use: No   • Sexual activity: Defer     Birth control/protection: OCP      Past Medical History:   Diagnosis Date   • Ankle pain, left    • Heavy menstrual bleeding    • Migraine    • PONV (postoperative nausea and vomiting)    • Spinal headache    • Tendonitis      left ankle   • Wears contact lenses      Past Surgical History:   Procedure Laterality Date   •  SECTION      x2   • TOTAL LAPAROSCOPIC HYSTERECTOMY Bilateral 2018    Procedure: TOTAL LAPAROSCOPIC HYSTERECTOMY BILATERAL SALPINGECTOMY;  Surgeon: Nelia Olea MD;  [place] [facility] [place] [facility] [place]  Service: Gynecology   • TUBAL ABDOMINAL LIGATION         Family History   Problem Relation Age of Onset   • Malig Hyperthermia Neg Hx          Review of Systems   Constitutional: Negative for chills, diaphoresis, fever and unexpected weight change.   HENT: Negative for hearing loss, nosebleeds, sore throat and tinnitus.    Eyes: Negative for pain and visual disturbance.   Respiratory: Negative for cough, shortness of breath and wheezing.    Cardiovascular: Negative for chest pain and palpitations.   Gastrointestinal: Negative for abdominal pain, diarrhea, nausea and vomiting.   Endocrine: Negative for cold intolerance, heat intolerance and polydipsia.   Genitourinary: Negative for difficulty urinating, dysuria and hematuria.   Musculoskeletal: Positive for arthralgias and myalgias. Negative for joint swelling.   Skin: Negative for rash and wound.   Allergic/Immunologic: Negative for environmental allergies.   Neurological: Negative for dizziness, syncope and numbness.   Hematological: Does not bruise/bleed easily.   Psychiatric/Behavioral: Negative for dysphoric mood and sleep disturbance. The patient is not nervous/anxious.            Objective:  Physical Exam  General: No acute distress.  Eyes: conjunctiva clear; pupils equally round and reactive  ENT: external ears and nose atraumatic; oropharynx clear  CV: no peripheral edema  Resp: normal respiratory effort  Skin: no rashes or wounds; normal turgor  Psych: mood and affect appropriate; recent and remote memory intact    There were no vitals filed for this visit.  There were no vitals filed for this visit.  There is no height or weight on file to calculate  BMI.      Ortho Exam     Right Knee Exam      Tenderness   The patient is experiencing tenderness in the lateral joint line.     Range of Motion   Extension: 0   Flexion: 120      Tests   Mackenzie:  Medial - negative Lateral - positive  Varus: negative Valgus: negative  Lachman:  Anterior - 1+    Posterior - negative  Drawer:  Anterior - negative    Posterior - negative  Patellar apprehension: positive     Other   Erythema: absent  Sensation: normal  Pulse: present  Swelling: moderate  Effusion: no effusion present     Comments:  Positive crepitus throughout arc of motion  Positive active patellar compression   Negative logroll exam   Negative stinchfield exam     Imaging:  Reviewed MRI results with patient right knee  Impression:  1.  Small horizontal oblique undersurface tear posterior horn lateral meniscus, suspected vertical radial free margin tear of the body segment lateral meniscus,  2.  No acute ligament injury.  3.  Moderate to high-grade articular cartilage loss the patellofemoral joint.  Full-thickness articular cartilage loss lateral patellar facet, median ridge.  Moderate grade chondromalacia of the medial patellar facet.   4.  Moderate to high-grade chondral thinning along the central weightbearing medial femoral condyle.  5.  Low to moderate grade chondromalacia along the central weightbearing surface of the lateral compartment.  6.  Moderate joint effusion with mild synovial proliferation.  7.5 cm popliteal cyst with fluid leaking along the posterior fascial planes of the leg.    Assessment:        1. Primary osteoarthritis of right knee    2. Tear of lateral meniscus of right knee, unspecified tear type, unspecified whether old or current tear, subsequent encounter           Plan:  1. Discussed plan of care with patient.  Wishes to proceed with submission for Visco supplementation injections right knee.  We will plan to submit will call patient to schedule after authorization approved.  Encouraged  to continue the meloxicam at this time.  She verbalized understanding of all information agrees with plan of care.  Denies other concerns present this time.  Orders:  No orders of the defined types were placed in this encounter.    Dictated utilizing Dragon dictation

## 2020-03-04 ENCOUNTER — APPOINTMENT (OUTPATIENT)
Dept: WOMENS IMAGING | Facility: HOSPITAL | Age: 50
End: 2020-03-04

## 2020-03-04 PROCEDURE — 77063 BREAST TOMOSYNTHESIS BI: CPT | Performed by: RADIOLOGY

## 2020-03-04 PROCEDURE — 77067 SCR MAMMO BI INCL CAD: CPT | Performed by: RADIOLOGY

## 2020-03-05 ENCOUNTER — CLINICAL SUPPORT (OUTPATIENT)
Dept: ORTHOPEDIC SURGERY | Facility: CLINIC | Age: 50
End: 2020-03-05

## 2020-03-05 DIAGNOSIS — S83.281D TEAR OF LATERAL MENISCUS OF RIGHT KNEE, UNSPECIFIED TEAR TYPE, UNSPECIFIED WHETHER OLD OR CURRENT TEAR, SUBSEQUENT ENCOUNTER: ICD-10-CM

## 2020-03-05 DIAGNOSIS — M17.0 OSTEOARTHRITIS OF PATELLOFEMORAL JOINTS OF BOTH KNEES: ICD-10-CM

## 2020-03-05 DIAGNOSIS — M17.11 PRIMARY OSTEOARTHRITIS OF RIGHT KNEE: Primary | ICD-10-CM

## 2020-03-05 PROCEDURE — 20610 DRAIN/INJ JOINT/BURSA W/O US: CPT | Performed by: NURSE PRACTITIONER

## 2020-03-05 NOTE — PROGRESS NOTES
Large Joint Arthrocentesis: R knee  Date/Time: 3/5/2020 1:18 PM  Consent given by: patient  Site marked: site marked  Timeout: Immediately prior to procedure a time out was called to verify the correct patient, procedure, equipment, support staff and site/side marked as required   Supporting Documentation  Indications: pain   Procedure Details  Location: knee - R knee  Preparation: Patient was prepped and draped in the usual sterile fashion  Needle size: 22 G  Approach: superolateral.  Medications administered: 30 mg Cross-Linked Hyaluronate 30 MG/3ML  Patient tolerance: patient tolerated the procedure well with no immediate complications        Patient presents to clinic today for right knee viscosupplement injections.  This is the single injection of the series.  I explained details of injections as well as risks, benefits and alternatives with the patient today, had all questions answered, wished to proceed with injections.  I will see patient back in 6 weeks for re-evaluation. Patient was instructed to watch for signs or symptoms of infection including redness, swelling, warmth to the touch, or significant increased pain and to contact our office immediately if any of these issues were noted.

## 2020-03-05 NOTE — PATIENT INSTRUCTIONS
Post-injection instructions    ? Apply ice to the injection side as needed to relieve pain, 10-15 minutes on and off every few hours.     ? Watch for signs and symptoms of infection, including significantly increased pain, redness, swelling, warmth to the touch, fevers, sweats, chills. If these symptoms occur, please notify our office immediately [phone].    ? Keep the injection site clean. You may remove adhesive bandage once bleeding has stopped.    ? Do not engage in any new or strenuous activities for at least the next 24 hours until soreness has stopped.    ? Some people will receive immediate relief with a steroid injection however it takes several days before the steroid starts working. You may receive 3-4 steroid injections a year, if necessary. If you get no relief with from the injection, we will continue to work with you to explore other treatment options.    ? It will take approximately four weeks after the last (or single) gel injection before you notice symptom relief.    * Some people experience redness or feeling of warmth after receiving corticosteroid injection. If you have diabetes, the corticosteroid injection may temporarily increase your blood sugar levels. Continue to check glucose levels and if not improving, contact your primary care provider for further treatment.   * Please keep mind that this is not a one-size-fits all approach. If you have questions or concerns, contact our office.

## 2020-04-17 ENCOUNTER — OFFICE VISIT (OUTPATIENT)
Dept: ORTHOPEDIC SURGERY | Facility: CLINIC | Age: 50
End: 2020-04-17

## 2020-04-17 VITALS — BODY MASS INDEX: 29.03 KG/M2 | HEIGHT: 67 IN | WEIGHT: 185 LBS

## 2020-04-17 DIAGNOSIS — S83.281D TEAR OF LATERAL MENISCUS OF RIGHT KNEE, UNSPECIFIED TEAR TYPE, UNSPECIFIED WHETHER OLD OR CURRENT TEAR, SUBSEQUENT ENCOUNTER: ICD-10-CM

## 2020-04-17 DIAGNOSIS — M17.11 PRIMARY OSTEOARTHRITIS OF RIGHT KNEE: Primary | ICD-10-CM

## 2020-04-17 PROCEDURE — 20610 DRAIN/INJ JOINT/BURSA W/O US: CPT | Performed by: NURSE PRACTITIONER

## 2020-04-17 RX ORDER — LIDOCAINE HYDROCHLORIDE 10 MG/ML
8 INJECTION, SOLUTION EPIDURAL; INFILTRATION; INTRACAUDAL; PERINEURAL
Status: COMPLETED | OUTPATIENT
Start: 2020-04-17 | End: 2020-04-17

## 2020-04-17 RX ORDER — ESTRADIOL 0.04 MG/D
FILM, EXTENDED RELEASE TRANSDERMAL
COMMUNITY
Start: 2020-04-02 | End: 2020-10-30

## 2020-04-17 RX ORDER — TRIAMCINOLONE ACETONIDE 40 MG/ML
80 INJECTION, SUSPENSION INTRA-ARTICULAR; INTRAMUSCULAR
Status: COMPLETED | OUTPATIENT
Start: 2020-04-17 | End: 2020-04-17

## 2020-04-17 RX ADMIN — TRIAMCINOLONE ACETONIDE 80 MG: 40 INJECTION, SUSPENSION INTRA-ARTICULAR; INTRAMUSCULAR at 12:19

## 2020-04-17 RX ADMIN — LIDOCAINE HYDROCHLORIDE 8 ML: 10 INJECTION, SOLUTION EPIDURAL; INFILTRATION; INTRACAUDAL; PERINEURAL at 12:19

## 2020-04-17 NOTE — PROGRESS NOTES
Subjective:     Patient ID: Celsa Tom is a 49 y.o. female.    Chief Complaint:  Follow-up primary osteoarthritis right knee  History of Present Illness  Celsa Tom returns to clinic for follow-up right knee.  Received Visco supplementation injection single injection right knee approximately 6 weeks ago and has noted significant symptom improvement approximately 70% better after the injection.  She continues to experience some mild swelling at the superior lateral aspect of the knee but the swelling and pain has significantly decreased.  She has been able to ambulate and do other activities with symptom improvement.  She does notice after long periods of standing and exercise that the knee is still hurting as well as if she sits and does nothing the knee is also hurting and feels stiff.  She has found it best to do light activity which she has been able to tolerate.  Denies of the knee is locking, catching or giving away at this time.  Denies that the pain is radiating to the groin or to the lateral aspect of the hip.  She is very pleased with the relief with the Visco supplementation injections.  Denies presence of numbness or tingling radiating down the right lower extremity.  Denies other concerns present this time.       Social History     Occupational History   • Not on file   Tobacco Use   • Smoking status: Never Smoker   • Smokeless tobacco: Never Used   Substance and Sexual Activity   • Alcohol use: Yes     Alcohol/week: 2.0 standard drinks     Types: 2 Glasses of wine per week   • Drug use: No   • Sexual activity: Defer     Birth control/protection: OCP      Past Medical History:   Diagnosis Date   • Ankle pain, left    • Heavy menstrual bleeding    • Migraine    • PONV (postoperative nausea and vomiting)    • Spinal headache    • Tendonitis     left ankle   • Wears contact lenses      Past Surgical History:   Procedure Laterality Date   •  SECTION      x2   • TOTAL LAPAROSCOPIC HYSTERECTOMY  "Bilateral 6/26/2018    Procedure: TOTAL LAPAROSCOPIC HYSTERECTOMY BILATERAL SALPINGECTOMY;  Surgeon: Nelia Olea MD;  [place] [facility] [place] [facility] [place]  Service: Gynecology   • TUBAL ABDOMINAL LIGATION         Family History   Problem Relation Age of Onset   • Malig Hyperthermia Neg Hx          Review of Systems   Constitutional: Negative for chills, diaphoresis, fever and unexpected weight change.   HENT: Negative for hearing loss, nosebleeds, sore throat and tinnitus.    Eyes: Negative for pain and visual disturbance.   Respiratory: Negative for cough, shortness of breath and wheezing.    Cardiovascular: Negative for chest pain and palpitations.   Gastrointestinal: Negative for abdominal pain, diarrhea, nausea and vomiting.   Endocrine: Negative for cold intolerance, heat intolerance and polydipsia.   Genitourinary: Negative for difficulty urinating, dysuria and hematuria.   Musculoskeletal: Positive for arthralgias and myalgias. Negative for joint swelling.   Skin: Negative for rash and wound.   Allergic/Immunologic: Negative for environmental allergies.   Neurological: Negative for dizziness, syncope and numbness.   Hematological: Does not bruise/bleed easily.   Psychiatric/Behavioral: Negative for dysphoric mood and sleep disturbance. The patient is not nervous/anxious.            Objective:  Physical Exam    General: No acute distress.  Eyes: conjunctiva clear; pupils equally round and reactive  ENT: external ears and nose atraumatic; oropharynx clear  CV: no peripheral edema  Resp: normal respiratory effort  Skin: no rashes or wounds; normal turgor  Psych: mood and affect appropriate; recent and remote memory intact    Vitals:    04/17/20 1148   Weight: 83.9 kg (185 lb)   Height: 170.2 cm (67\")         04/17/20  1148   Weight: 83.9 kg (185 lb)     Body mass index is 28.98 kg/m².      Ortho Exam     Right knee exam  Range of motion 0 degrees to 125 degrees  Strength 4+ out of 5  Positive sensation light touch all " distributions right lower extremity  Stable to varus and valgus stress at 0 degrees and 30 degrees  Negative logroll exam  Interstitial exam  2+ posterior tibialis pulse  Negative anterior posterior drawer exam  1+ anterior Lachman's negative posterior Lachman's  Positive crepitus throughout arc of motion  Positive active patellar compression test, positive patellar apprehension    Assessment:        1. Primary osteoarthritis of right knee    2. Tear of lateral meniscus of right knee, unspecified tear type, unspecified whether old or current tear, subsequent encounter           Plan:   1.  Discussed plan of care with patient.  She would like to go ahead and proceed with corticosteroid injection to see if we can further reduce the swelling in the knee.  Discussed with her that this is an option I would recommend pushing it out a couple weeks however she wishes to proceed while she is here to see if she can further reduce the swelling which is also okay.  Discussed we can complete corticosteroid injection again in 3 months and I do highly recommend that we submit for Visco supplementation injections again at the end of May.  Insurance did not cover she did have to pay out-of-pocket but she received significant symptom relief greater than what she is received with the corticosteroid injections in the past.  With plan to complete Visco injection again in 6 months in a day. Patient verbalized understanding of all information and agrees with plan of care. Denies all other concerns present at this time.     Large Joint Arthrocentesis: R knee  Date/Time: 4/17/2020 12:19 PM  Consent given by: patient  Site marked: site marked  Timeout: Immediately prior to procedure a time out was called to verify the correct patient, procedure, equipment, support staff and site/side marked as required   Supporting Documentation  Indications: pain   Procedure Details  Location: knee - R knee  Preparation: Patient was prepped and draped in  the usual sterile fashion  Needle size: 22 G  Approach: superior (LATERAL)  Medications administered: 8 mL lidocaine PF 1% 1 %; 80 mg triamcinolone acetonide 40 MG/ML  Patient tolerance: patient tolerated the procedure well with no immediate complications          Orders:  Orders Placed This Encounter   Procedures   • Large Joint Arthrocentesis: R knee       Medications:  No orders of the defined types were placed in this encounter.      Followup:  No follow-ups on file.    Celsa was seen today for follow-up and pain.    Diagnoses and all orders for this visit:    Primary osteoarthritis of right knee    Tear of lateral meniscus of right knee, unspecified tear type, unspecified whether old or current tear, subsequent encounter    Other orders  -     Large Joint Arthrocentesis: R knee        Dictated utilizing Dragon dictation

## 2020-05-05 RX ORDER — MELOXICAM 15 MG/1
15 TABLET ORAL DAILY
Qty: 30 TABLET | Refills: 3 | Status: SHIPPED | OUTPATIENT
Start: 2020-05-05 | End: 2020-09-10

## 2020-09-09 ENCOUNTER — OFFICE VISIT (OUTPATIENT)
Dept: ORTHOPEDIC SURGERY | Facility: CLINIC | Age: 50
End: 2020-09-09

## 2020-09-09 DIAGNOSIS — M17.11 PRIMARY OSTEOARTHRITIS OF RIGHT KNEE: Primary | ICD-10-CM

## 2020-09-09 PROCEDURE — 99213 OFFICE O/P EST LOW 20 MIN: CPT | Performed by: NURSE PRACTITIONER

## 2020-09-09 RX ORDER — METHYLPREDNISOLONE 4 MG/1
TABLET ORAL
Qty: 21 TABLET | Refills: 0 | Status: SHIPPED | OUTPATIENT
Start: 2020-09-09 | End: 2020-10-30

## 2020-09-09 NOTE — PROGRESS NOTES
Subjective:     Patient ID: Celsa Tom is a 49 y.o. female.    Chief Complaint:  Follow-up DJD right knee  History of Present Illness  Celsa Tom returns to clinic for follow-up right knee.  Continues to experience maximal discomfort the lateral aspect of the knee as well as the anterior aspect.  Increased pain with activity involving deep flexion, ambulating long distances, standing for extended periods of time.  Continues to deny presence of locking, catching or giving away.  She does continue to experience mild swelling at the knee is continued ambulating for exercise 3 miles a day tolerating fairly well.  Pain began returning in July but is more significant now.  Rates discomfort 6-7 out of the 10 mainly aching in nature.  Pain is not rating to her groin or to the lateral aspect of her hip.  Denies other concerns present time.     Social History     Occupational History   • Not on file   Tobacco Use   • Smoking status: Never Smoker   • Smokeless tobacco: Never Used   Substance and Sexual Activity   • Alcohol use: Yes     Alcohol/week: 2.0 standard drinks     Types: 2 Glasses of wine per week   • Drug use: No   • Sexual activity: Defer     Birth control/protection: OCP      Past Medical History:   Diagnosis Date   • Ankle pain, left    • Heavy menstrual bleeding    • Migraine    • PONV (postoperative nausea and vomiting)    • Spinal headache    • Tendonitis     left ankle   • Wears contact lenses      Past Surgical History:   Procedure Laterality Date   •  SECTION      x2   • TOTAL LAPAROSCOPIC HYSTERECTOMY Bilateral 2018    Procedure: TOTAL LAPAROSCOPIC HYSTERECTOMY BILATERAL SALPINGECTOMY;  Surgeon: Nelia Olea MD;  [place] [facility] [place] [facility] [place]  Service: [place]necology   • TUBAL ABDOMINAL LIGATION         Family History   Problem Relation Age of Onset   • Malig Hyperthermia Neg Hx          Review of Systems   Constitutional: Negative for chills, diaphoresis, fever and unexpected weight change.    HENT: Negative for hearing loss, nosebleeds, sore throat and tinnitus.    Eyes: Negative for pain and visual disturbance.   Respiratory: Negative for cough, shortness of breath and wheezing.    Cardiovascular: Negative for chest pain and palpitations.   Gastrointestinal: Negative for abdominal pain, diarrhea, nausea and vomiting.   Endocrine: Negative for cold intolerance, heat intolerance and polydipsia.   Genitourinary: Negative for difficulty urinating, dysuria and hematuria.   Musculoskeletal: Positive for arthralgias. Negative for joint swelling and myalgias.   Skin: Negative for rash and wound.   Allergic/Immunologic: Negative for environmental allergies.   Neurological: Negative for dizziness, syncope and numbness.   Hematological: Does not bruise/bleed easily.   Psychiatric/Behavioral: Negative for dysphoric mood and sleep disturbance. The patient is not nervous/anxious.            Objective:  Physical Exam    General: No acute distress.  Eyes: conjunctiva clear; pupils equally round and reactive  ENT: external ears and nose atraumatic; oropharynx clear  CV: no peripheral edema  Resp: normal respiratory effort  Skin: no rashes or wounds; normal turgor  Psych: mood and affect appropriate; recent and remote memory intact    There were no vitals filed for this visit.  There were no vitals filed for this visit.  There is no height or weight on file to calculate BMI.      Right Knee Exam     Tenderness   The patient is experiencing tenderness in the lateral joint line and patella.    Range of Motion   Extension: 0   Flexion: 130     Tests   Mackenzie:  Medial - negative Lateral - positive  Varus: negative Valgus: negative  Lachman:  Anterior - 1+    Posterior - negative  Drawer:  Anterior - negative    Posterior - negative  Patellar apprehension: positive    Other   Erythema: absent  Sensation: normal  Pulse: present  Swelling: moderate  Effusion: no effusion present    Comments:  Positive crepitus throughout  arc of motion  Positive active patellar compression test, positive patellar apprehension           Assessment:        1. Primary osteoarthritis of right knee           Plan:  1. Discussed plan of care with patient. Will start process for Visco supplementation injection again for the right knee since she has had significant symptom improvement with the medication.  Will provide Medrol Dosepak for pain and swelling between now and then.  Plan to see her back in clinic after authorization approved to start Visco injection.  She verbalized understanding of information agrees with plan of care.  Denies other concerns present this time.  Orders:  Orders Placed This Encounter   Procedures   • Visco Treatment       Medications:  New Medications Ordered This Visit   Medications   • methylPREDNISolone (MEDROL) 4 MG dose pack     Sig: Use as directed by package instructions     Dispense:  21 tablet     Refill:  0       Followup:  No follow-ups on file.    Celsa was seen today for follow-up.    Diagnoses and all orders for this visit:    Primary osteoarthritis of right knee  -     Visco Treatment; Future    Other orders  -     methylPREDNISolone (MEDROL) 4 MG dose pack; Use as directed by package instructions        Dictated utilizing Dragon dictation

## 2020-09-10 RX ORDER — MELOXICAM 15 MG/1
15 TABLET ORAL DAILY
Qty: 30 TABLET | Refills: 3 | Status: SHIPPED | OUTPATIENT
Start: 2020-09-10 | End: 2021-04-22

## 2020-10-30 ENCOUNTER — CLINICAL SUPPORT (OUTPATIENT)
Dept: ORTHOPEDIC SURGERY | Facility: CLINIC | Age: 50
End: 2020-10-30

## 2020-10-30 VITALS — BODY MASS INDEX: 29.03 KG/M2 | HEIGHT: 67 IN | WEIGHT: 185 LBS

## 2020-10-30 DIAGNOSIS — M17.11 PRIMARY OSTEOARTHRITIS OF RIGHT KNEE: ICD-10-CM

## 2020-10-30 DIAGNOSIS — G89.29 CHRONIC PAIN OF RIGHT KNEE: Primary | ICD-10-CM

## 2020-10-30 DIAGNOSIS — M25.561 CHRONIC PAIN OF RIGHT KNEE: Primary | ICD-10-CM

## 2020-10-30 PROCEDURE — 20610 DRAIN/INJ JOINT/BURSA W/O US: CPT | Performed by: NURSE PRACTITIONER

## 2020-10-30 RX ORDER — ESTRADIOL 0.05 MG/D
FILM, EXTENDED RELEASE TRANSDERMAL
COMMUNITY
Start: 2020-09-10

## 2020-10-30 NOTE — PROGRESS NOTES
Procedure   Large Joint Arthrocentesis: R knee  Date/Time: 10/30/2020 9:15 AM  Consent given by: patient  Site marked: site marked  Timeout: Immediately prior to procedure a time out was called to verify the correct patient, procedure, equipment, support staff and site/side marked as required   Supporting Documentation  Indications: pain   Procedure Details  Location: knee - R knee  Preparation: Patient was prepped and draped in the usual sterile fashion  Needle size: 18 G  Approach: superior (LATERAL)  Medications administered: 30 mg Cross-Linked Hyaluronate 30 MG/3ML  Patient tolerance: patient tolerated the procedure well with no immediate complications      G[employer] ONE Cross-Linked Hyaluronate Buy and Bill Office Provided  EPI:[id number]  LOT:[id number]  EXP:08-   NDC:[address]-67     Patient presents to clinic today for right knee viscosupplement injections.  This is the single injection of the series.  I explained details of injections as well as risks, benefits and alternatives with the patient today, had all questions answered, wished to proceed with injections.  I will see patient back as needed discussed the option of corticosteroid injections in future as needed.  Patient was instructed to watch for signs or symptoms of infection including redness, swelling, warmth to the touch, or significant increased pain and to contact our office immediately if any of these issues were noted.

## 2020-12-16 ENCOUNTER — TELEPHONE (OUTPATIENT)
Dept: ORTHOPEDIC SURGERY | Facility: CLINIC | Age: 50
End: 2020-12-16

## 2020-12-16 NOTE — TELEPHONE ENCOUNTER
PATIENT RECEIVED BILL/INOICE FOR INJ (10-30-20), SHE STATES THIS WAS PRE-AUTHORIZED, AND SHOULD NOT HAD RECEIVED A BILL.  THE BILL WAS OVER 1000.00 AND WOULD LIKE TO SPEAK TO SOMEONE IN BILLING.    PLEASE CALL KI CURIEL TO DISCUSS:  [phone]

## 2021-03-08 ENCOUNTER — APPOINTMENT (OUTPATIENT)
Dept: WOMENS IMAGING | Facility: HOSPITAL | Age: 51
End: 2021-03-08

## 2021-03-08 PROCEDURE — 77063 BREAST TOMOSYNTHESIS BI: CPT | Performed by: RADIOLOGY

## 2021-03-08 PROCEDURE — 77067 SCR MAMMO BI INCL CAD: CPT | Performed by: RADIOLOGY

## 2021-04-22 RX ORDER — MELOXICAM 15 MG/1
15 TABLET ORAL DAILY
Qty: 30 TABLET | Refills: 3 | Status: SHIPPED | OUTPATIENT
Start: 2021-04-22 | End: 2021-08-30

## 2021-08-30 RX ORDER — MELOXICAM 15 MG/1
15 TABLET ORAL DAILY
Qty: 30 TABLET | Refills: 3 | Status: SHIPPED | OUTPATIENT
Start: 2021-08-30 | End: 2022-09-23 | Stop reason: SINTOL

## 2022-03-09 ENCOUNTER — APPOINTMENT (OUTPATIENT)
Dept: WOMENS IMAGING | Facility: HOSPITAL | Age: 52
End: 2022-03-09

## 2022-03-09 PROCEDURE — 77067 SCR MAMMO BI INCL CAD: CPT | Performed by: RADIOLOGY

## 2022-03-09 PROCEDURE — 77063 BREAST TOMOSYNTHESIS BI: CPT | Performed by: RADIOLOGY

## 2022-09-23 ENCOUNTER — OFFICE VISIT (OUTPATIENT)
Dept: ORTHOPEDIC SURGERY | Facility: CLINIC | Age: 52
End: 2022-09-23

## 2022-09-23 VITALS — BODY MASS INDEX: 29.03 KG/M2 | WEIGHT: 185 LBS | HEIGHT: 67 IN

## 2022-09-23 DIAGNOSIS — M17.11 PRIMARY OSTEOARTHRITIS OF RIGHT KNEE: Primary | ICD-10-CM

## 2022-09-23 DIAGNOSIS — G89.29 CHRONIC PAIN OF RIGHT KNEE: ICD-10-CM

## 2022-09-23 DIAGNOSIS — M25.561 CHRONIC PAIN OF RIGHT KNEE: ICD-10-CM

## 2022-09-23 PROCEDURE — 99213 OFFICE O/P EST LOW 20 MIN: CPT | Performed by: NURSE PRACTITIONER

## 2022-09-23 PROCEDURE — 73562 X-RAY EXAM OF KNEE 3: CPT | Performed by: NURSE PRACTITIONER

## 2022-09-23 NOTE — PROGRESS NOTES
Returns to clinic for follow-up of her right knee. Has received viscosupplementation injections in the past with great symptom relief. Is recently began noticing over the last 1 to 2 weeks pain is returning along the anterior aspect of the knee. Denies known injury continues to experience popping grinding sensation along the anterior aspect of the knee. Denies that the knee is locking, catching or giving way. Denies any recent x-ray imaging, MRI, CT. Denies any recent corticosteroid injections, viscosupplementation injections. She is continued with exercise regimen tolerating well. Denies any other concerns present.    Plan:  1. Discussed plan of care with patient. She does wish to proceed with submission for viscosupplementation injections. We will gladly see her back in clinic once authorized to start series. Continue with quad strengthening hamstring strengthening calf strengthening exercises. All questions answered.

## 2022-09-23 NOTE — PROGRESS NOTES
Subjective:     Patient ID: Celsa Tom is a 51 y.o. female.    Chief Complaint:  Follow-up DJD right knee  History of Present Illness  Celsa Tom  Returns to clinic for follow-up of her right knee.  Has received viscosupplementation injections in the past with great symptom relief.  Is recently began noticing over the last 1 to 2 weeks pain is returning along the anterior aspect of the knee.  Denies known injury continues to experience popping grinding sensation along the anterior aspect of the knee.  Denies that the knee is locking, catching or giving way.  Denies any recent x-ray imaging, MRI, CT.  Denies any recent corticosteroid injections, viscosupplementation injections.  She is continued with exercise regimen tolerating well.  Denies any other concerns present.       Social History     Occupational History   • Not on file   Tobacco Use   • Smoking status: Never Smoker   • Smokeless tobacco: Never Used   Vaping Use   • Vaping Use: Never used   Substance and Sexual Activity   • Alcohol use: Yes     Alcohol/week: 2.0 standard drinks     Types: 2 Glasses of wine per week   • Drug use: No   • Sexual activity: Defer     Birth control/protection: OCP      Past Medical History:   Diagnosis Date   • Ankle pain, left    • Heavy menstrual bleeding    • Migraine    • PONV (postoperative nausea and vomiting)    • Spinal headache    • Tendonitis     left ankle   • Wears contact lenses      Past Surgical History:   Procedure Laterality Date   •  SECTION      x2   • TOTAL LAPAROSCOPIC HYSTERECTOMY Bilateral 2018    Procedure: TOTAL LAPAROSCOPIC HYSTERECTOMY BILATERAL SALPINGECTOMY;  Surgeon: Nelia Olea MD;  Location: [facility] OR [facility] [place];  Service: Gynecology   • TUBAL ABDOMINAL LIGATION         Family History   Problem Relation Age of Onset   • Malig Hyperthermia Neg Hx                Objective:  Physical Exam    General: No acute distress.  Eyes: conjunctiva clear; pupils equally round and  "reactive  ENT: external ears and nose atraumatic; oropharynx clear  CV: no peripheral edema  Resp: normal respiratory effort  Skin: no rashes or wounds; normal turgor  Psych: mood and affect appropriate; recent and remote memory intact    Vitals:    09/23/22 1107   Weight: 83.9 kg (185 lb)   Height: 170.2 cm (67\")         09/23/22  1107   Weight: 83.9 kg (185 lb)     Body mass index is 28.98 kg/m².      Right Knee Exam     Tenderness   The patient is experiencing tenderness in the patella.    Range of Motion   Extension: 0   Right knee flexion: 125.     Tests   Mackenzie:  Medial - negative Lateral - negative  Varus: negative Valgus: negative  Lachman:  Anterior - 1+    Posterior - negative  Drawer:  Anterior - negative    Posterior - negative  Patellar apprehension: positive    Other   Erythema: absent  Sensation: normal  Pulse: present  Swelling: moderate  Effusion: no effusion present    Comments:  Positive active patellar compression test  Positive crepitus throughout arc of motion                 Imaging:  Right Knee X-Ray  Indication: Pain    AP, Lateral, and [place] views    Findings:  No fracture  No bony lesion  Normal soft tissues  Medial compartment narrowing, mild to moderate, moderate patellofemoral narrowing with reactive osteophytes in all 3 compartments no significant advancement of arthritis compared to x-ray imaging completed 2019 available for viewing in office    prior studies were available for comparison.    Assessment:        1. Primary osteoarthritis of right knee    2. Chronic pain of right knee           Plan:  1.  Discussed plan of care with patient.  She does wish to proceed with submission for viscosupplementation injections.  We will gladly see her back in clinic once authorized to start series.  Continue with quad strengthening hamstring strengthening calf strengthening exercises.  All questions answered.  Orders:  Orders Placed This Encounter   Procedures   • XR Knee 3+ View With " [place] Right   • Visco Treatment     No orders of the defined types were placed in this encounter.      BMI is >= 25 and <30. (Overweight) The following options were offered after discussion;: weight loss educational material (shared in after visit summary)  I ordered and reviewed the BELKYS today.     Dragon dictation utilized.

## 2022-11-14 ENCOUNTER — CLINICAL SUPPORT (OUTPATIENT)
Dept: ORTHOPEDIC SURGERY | Facility: CLINIC | Age: 52
End: 2022-11-14

## 2022-11-14 VITALS — BODY MASS INDEX: 29.03 KG/M2 | WEIGHT: 185 LBS | HEIGHT: 67 IN

## 2022-11-14 DIAGNOSIS — M17.11 PRIMARY OSTEOARTHRITIS OF RIGHT KNEE: Primary | ICD-10-CM

## 2022-11-14 PROCEDURE — 20610 DRAIN/INJ JOINT/BURSA W/O US: CPT | Performed by: NURSE PRACTITIONER

## 2022-11-14 NOTE — PROGRESS NOTES
Procedure   Large Joint Arthrocentesis: R knee  Date/Time: 11/14/2022 9:05 AM  Consent given by: patient  Site marked: site marked  Timeout: Immediately prior to procedure a time out was called to verify the correct patient, procedure, equipment, support staff and site/side marked as required   Supporting Documentation  Indications: pain   Procedure Details  Location: knee - R knee  Preparation: Patient was prepped and draped in the usual sterile fashion  Needle gauge: 21G.  Approach: superior (lateral)  Medications administered: 60 mg Sodium Hyaluronate 60 MG/3ML  Patient tolerance: patient tolerated the procedure well with no immediate complications      Durolane provided via [facility].    Patient presents to clinic today for right knee viscosupplement injections.  This is the single injection of the series.  I explained details of injections as well as risks, benefits and alternatives with the patient today, had all questions answered, wished to proceed with injections.  I will see patient back as needed if not improving or symptoms get worse. Patient was instructed to watch for signs or symptoms of infection including redness, swelling, warmth to the touch, or significant increased pain and to contact our office immediately if any of these issues were noted.

## 2023-06-19 ENCOUNTER — OFFICE VISIT (OUTPATIENT)
Dept: ORTHOPEDIC SURGERY | Facility: CLINIC | Age: 53
End: 2023-06-19
Payer: COMMERCIAL

## 2023-06-19 VITALS — HEIGHT: 67 IN | BODY MASS INDEX: 31.89 KG/M2 | WEIGHT: 203.2 LBS

## 2023-06-19 DIAGNOSIS — M17.11 PRIMARY OSTEOARTHRITIS OF RIGHT KNEE: Primary | ICD-10-CM

## 2023-06-19 DIAGNOSIS — M17.12 PRIMARY OSTEOARTHRITIS OF LEFT KNEE: ICD-10-CM

## 2023-06-19 DIAGNOSIS — M17.0 OSTEOARTHRITIS OF PATELLOFEMORAL JOINTS OF BOTH KNEES: ICD-10-CM

## 2023-06-19 RX ORDER — LIDOCAINE HYDROCHLORIDE 10 MG/ML
8 INJECTION, SOLUTION EPIDURAL; INFILTRATION; INTRACAUDAL; PERINEURAL
Status: COMPLETED | OUTPATIENT
Start: 2023-06-19 | End: 2023-06-19

## 2023-06-19 RX ORDER — MELOXICAM 15 MG/1
15 TABLET ORAL DAILY
Qty: 30 TABLET | Refills: 1 | Status: SHIPPED | OUTPATIENT
Start: 2023-06-19

## 2023-06-19 RX ORDER — TRIAMCINOLONE ACETONIDE 40 MG/ML
80 INJECTION, SUSPENSION INTRA-ARTICULAR; INTRAMUSCULAR
Status: COMPLETED | OUTPATIENT
Start: 2023-06-19 | End: 2023-06-19

## 2023-06-19 RX ADMIN — LIDOCAINE HYDROCHLORIDE 8 ML: 10 INJECTION, SOLUTION EPIDURAL; INFILTRATION; INTRACAUDAL; PERINEURAL at 09:43

## 2023-06-19 RX ADMIN — TRIAMCINOLONE ACETONIDE 80 MG: 40 INJECTION, SUSPENSION INTRA-ARTICULAR; INTRAMUSCULAR at 09:43

## 2023-06-19 NOTE — PROGRESS NOTES
Subjective:     Patient ID: Celsa Tom is a 52 y.o. female.    Chief Complaint:  Follow-up DJD right knee, left knee, DJD follow-up  History of Present Illness  Celsa Tom returns to clinic for follow-up bilateral knees.  Received corticosteroid injection as well as viscosupplementation injections in the past with greater symptom relief with viscosupplementation injections in for longer periods.  She has received steroid injections which do help with short-term symptom relief.  She is experiencing pain at the medial compartment as well as the anterior aspect of bilateral knees denies any recent x-ray, MRI, CT of the left knee.  She is experiencing pain with ascending and descending stairs, transitional activities such as seated standing attempting to walk, ambulating long distances and standing for extended periods of time.  Is continue with low impact activity, anti-inflammatory meloxicam, alternating heat and ice and topical analgesics with mild symptom improvement.  She does have to be cautious with the anti-inflammatory as it does cause GI upset.  Denies any other concerns present.       Social History     Occupational History    Not on file   Tobacco Use    Smoking status: Never    Smokeless tobacco: Never   Vaping Use    Vaping Use: Never used   Substance and Sexual Activity    Alcohol use: Yes     Alcohol/week: 2.0 standard drinks     Types: 2 Glasses of wine per week    Drug use: No    Sexual activity: Defer     Birth control/protection: OCP      Past Medical History:   Diagnosis Date    Ankle pain, left     Heavy menstrual bleeding     Migraine     PONV (postoperative nausea and vomiting)     Spinal headache     Tendonitis     left ankle    Wears contact lenses      Past Surgical History:   Procedure Laterality Date     SECTION      x2    TOTAL LAPAROSCOPIC HYSTERECTOMY Bilateral 2018    Procedure: TOTAL LAPAROSCOPIC HYSTERECTOMY BILATERAL SALPINGECTOMY;  Surgeon: Nelia Olea MD;   "[place] [facility] [place] [facility] [place]  Service: Gynecology    TUBAL ABDOMINAL LIGATION         Family History   Problem Relation Age of Onset    Malig Hyperthermia Neg Hx                Objective:  Physical Exam    General: No acute distress.  Eyes: conjunctiva clear; pupils equally round and reactive  ENT: external ears and nose atraumatic; oropharynx clear  CV: no peripheral edema  Resp: normal respiratory effort  Skin: no rashes or wounds; normal turgor  Psych: mood and affect appropriate; recent and remote memory intact    Vitals:    06/19/23 0856   Weight: 92.2 kg (203 lb 3.2 oz)   Height: 170.2 cm (67\")         06/19/23  0856   Weight: 92.2 kg (203 lb 3.2 oz)     Body mass index is 31.83 kg/m².      Ortho Exam     Bilateral knees examined  Positive tenderness right knee on the medial compartment, patellofemoral joint  Positive tenderness left knee along the patellofemoral joint, medial compartment  Bilateral knee range of motion 0 to 125 degrees  Stable varus and valgus stress at 0 degrees and 30 degrees  1+ anterior Lachman  Negative medial and lateral Mackenzie's exam  Positive active patellar compression test  Positive crepitus or arc of motion  Positive sensation light touch all distributions bilateral lower extremities  Negative logroll exam    Imaging:  Left Knee X-Ray  Indication: Pain    AP, Lateral, and [place] views    Findings:  No fracture  No bony lesion  Normal soft tissues  Mild medial, Patellofemoral narrowing with reactive osteophytes superior and inferior patellar pole    Prior studies were available for comparison.    Assessment:        1. Primary osteoarthritis of right knee    2. Osteoarthritis of patellofemoral joints of both knees    3. Primary osteoarthritis of left knee           Plan:    - Large Joint Arthrocentesis: bilateral knee on 6/19/2023 9:43 AM  Indications: pain  Details: 22 G needle, superolateral approach  Medications (Right): 80 mg triamcinolone acetonide 40 MG/ML; 8 mL lidocaine PF 1% " 1 %  Medications (Left): 80 mg triamcinolone acetonide 40 MG/ML; 8 mL lidocaine PF 1% 1 %  Outcome: tolerated well, no immediate complications  Procedure, treatment alternatives, risks and benefits explained, specific risks discussed. Immediately prior to procedure a time out was called to verify the correct patient, procedure, equipment, support staff and site/side marked as required. Patient was prepped and draped in the usual sterile fashion.       Discussed plan of care with patient.  She does wish to proceed with corticosteroid injections bilateral knees.  We will proceed with submission for viscosupplementation injections bilateral knees.  I do recommend application of ice at injection site this evening.  Again has received significant symptom improvement in the past with Visco injections.  We will plan to see her back in clinic once Visco approved to start series.  Continue low impact exercises, strengthening of lower extremities.  All questions answered today's visit.  Orders:  Orders Placed This Encounter   Procedures    - Large Joint Arthrocentesis: bilateral knee    XR Knee 3+ View With [place] Left    Visco Treatment     New Medications Ordered This Visit   Medications    meloxicam (MOBIC) 15 MG tablet     Sig: Take 1 tablet by mouth Daily.     Dispense:  30 tablet     Refill:  1           I ordered and reviewed the BELKYS today.       Dragon dictation utilized

## 2023-07-10 ENCOUNTER — PATIENT MESSAGE (OUTPATIENT)
Dept: ORTHOPEDIC SURGERY | Facility: CLINIC | Age: 53
End: 2023-07-10

## 2023-07-26 ENCOUNTER — CLINICAL SUPPORT (OUTPATIENT)
Dept: ORTHOPEDIC SURGERY | Facility: CLINIC | Age: 53
End: 2023-07-26
Payer: COMMERCIAL

## 2023-07-26 DIAGNOSIS — M17.11 PRIMARY OSTEOARTHRITIS OF RIGHT KNEE: Primary | ICD-10-CM

## 2023-07-26 NOTE — PROGRESS NOTES
Large Joint Arthrocentesis: R knee  Date/Time: 7/26/2023 11:14 AM  Consent given by: patient  Site marked: site marked  Timeout: Immediately prior to procedure a time out was called to verify the correct patient, procedure, equipment, support staff and site/side marked as required   Supporting Documentation  Indications: pain   Procedure Details  Location: knee - R knee  Preparation: Patient was prepped and draped in the usual sterile fashion  Needle size: 20 G  Approach: superior  Medications administered: 30 mg Cross-Linked Hyaluronate 30 MG/3ML     Patient presents to clinic today for right knee viscosupplement injections.  This is the single injection of the series.  I explained details of injections as well as risks, benefits and alternatives with the patient today, had all questions answered, wished to proceed with injections.  I will see patient back as needed for re-evaluation. Patient was instructed to watch for signs or symptoms of infection including redness, swelling, warmth to the touch, or significant increased pain and to contact our office immediately if any of these issues were noted.

## 2023-10-02 ENCOUNTER — PATIENT MESSAGE (OUTPATIENT)
Dept: ORTHOPEDIC SURGERY | Facility: CLINIC | Age: 53
End: 2023-10-02

## 2023-10-24 ENCOUNTER — OFFICE VISIT (OUTPATIENT)
Dept: ORTHOPEDIC SURGERY | Facility: CLINIC | Age: 53
End: 2023-10-24
Payer: COMMERCIAL

## 2023-10-24 VITALS — BODY MASS INDEX: 31.39 KG/M2 | WEIGHT: 200 LBS | HEIGHT: 67 IN

## 2023-10-24 DIAGNOSIS — M17.11 PRIMARY OSTEOARTHRITIS OF RIGHT KNEE: Primary | ICD-10-CM

## 2023-10-24 DIAGNOSIS — M17.0 OSTEOARTHRITIS OF PATELLOFEMORAL JOINTS OF BOTH KNEES: ICD-10-CM

## 2023-10-24 PROCEDURE — 99214 OFFICE O/P EST MOD 30 MIN: CPT | Performed by: NURSE PRACTITIONER

## 2023-10-24 PROCEDURE — 20610 DRAIN/INJ JOINT/BURSA W/O US: CPT | Performed by: NURSE PRACTITIONER

## 2023-10-24 PROCEDURE — 73562 X-RAY EXAM OF KNEE 3: CPT | Performed by: NURSE PRACTITIONER

## 2023-10-24 RX ORDER — TRIAMCINOLONE ACETONIDE 40 MG/ML
80 INJECTION, SUSPENSION INTRA-ARTICULAR; INTRAMUSCULAR
Status: COMPLETED | OUTPATIENT
Start: 2023-10-24 | End: 2023-10-24

## 2023-10-24 RX ORDER — LIDOCAINE HYDROCHLORIDE 10 MG/ML
8 INJECTION, SOLUTION EPIDURAL; INFILTRATION; INTRACAUDAL; PERINEURAL
Status: COMPLETED | OUTPATIENT
Start: 2023-10-24 | End: 2023-10-24

## 2023-10-24 RX ORDER — CELECOXIB 200 MG/1
200 CAPSULE ORAL DAILY
Qty: 30 CAPSULE | Refills: 5 | Status: SHIPPED | OUTPATIENT
Start: 2023-10-24

## 2023-10-24 RX ADMIN — LIDOCAINE HYDROCHLORIDE 8 ML: 10 INJECTION, SOLUTION EPIDURAL; INFILTRATION; INTRACAUDAL; PERINEURAL at 10:49

## 2023-10-24 RX ADMIN — TRIAMCINOLONE ACETONIDE 80 MG: 40 INJECTION, SUSPENSION INTRA-ARTICULAR; INTRAMUSCULAR at 10:49

## 2023-10-24 NOTE — PROGRESS NOTES
Subjective:     Patient ID: Celsa Tom is a 53 y.o. female.    Chief Complaint:  Follow-up DJD right knee  Viscosupplementation injection right knee 2023  Corticosteroid injections bilateral knees 2023  History of Present Illness  Celsa Tom returns to clinic for follow-up of bilateral knees.  She received viscosupplementation injection 2023 with significant symptom improvement she returns today with maximal tenderness present medial compartment as well as the anterior aspect of the knee she is experiencing discomfort bilateral knees.  She did have a 2-week period where she was able to rest due to unrelated illness but is experiencing pain with increased activity.  She has received significant symptom improvement with the prior viscosupplementation injections which seem to provide her longer lasting relief denies that the knee is locking, catching or giving way denies any other concerns present.     Social History     Occupational History    Not on file   Tobacco Use    Smoking status: Never     Passive exposure: Never    Smokeless tobacco: Never   Vaping Use    Vaping Use: Never used   Substance and Sexual Activity    Alcohol use: Yes     Alcohol/week: 2.0 standard drinks of alcohol     Types: 2 Glasses of wine per week    Drug use: No    Sexual activity: Defer     Birth control/protection: OCP      Past Medical History:   Diagnosis Date    Ankle pain, left     Heavy menstrual bleeding     Migraine     PONV (postoperative nausea and vomiting)     Spinal headache     Tendonitis     left ankle    Wears contact lenses      Past Surgical History:   Procedure Laterality Date     SECTION      x2    TOTAL LAPAROSCOPIC HYSTERECTOMY Bilateral 2018    Procedure: TOTAL LAPAROSCOPIC HYSTERECTOMY BILATERAL SALPINGECTOMY;  Surgeon: Nelia Olea MD;  Location: [facility] OR [facility] [place];  Service: Gynecology    TUBAL ABDOMINAL LIGATION         Family History   Problem Relation Age of Onset    Jesse  "Hyperthermia Neg Hx                Objective:  Physical Exam    General: No acute distress.  Eyes: conjunctiva clear; pupils equally round and reactive  ENT: external ears and nose atraumatic; oropharynx clear  CV: no peripheral edema  Resp: normal respiratory effort  Skin: no rashes or wounds; normal turgor  Psych: mood and affect appropriate; recent and remote memory intact    Vitals:    10/24/23 0952   Weight: 90.7 kg (200 lb)   Height: 170.2 cm (67\")         10/24/23  0952   Weight: 90.7 kg (200 lb)     Body mass index is 31.32 kg/m².      Ortho Exam     Bilateral knees examined  Knee range of motion 0 to 130 degrees  Stable to varus valgus stress at 0 degrees and 30 degrees  Positive active patellar compression test  Positive crepitus throughout arc of motion  1+ anterior Lachman  Negative anterior posterior drawer exam  Negative medial and lateral Mackenzie's exam  Maximal tenderness patellofemoral joint, medial compartment    Imaging:  Right Knee X-Ray  Indication: Pain    AP, Lateral, and [place] views    Findings:  No fracture  No bony lesion  Normal soft tissues  Mild medial compartment narrowing, patellofemoral narrowing moderate with reactive osteophytes in all 3 compartments  prior studies were available for comparison.    Assessment:        1. Primary osteoarthritis of right knee    2. Osteoarthritis of patellofemoral joints of both knees           Plan:    - Large Joint Arthrocentesis: bilateral knee on 10/24/2023 10:49 AM  Indications: pain  Details: 22 G needle, superolateral approach  Medications (Right): 8 mL lidocaine PF 1% 1 %; 80 mg triamcinolone acetonide 40 MG/ML  Medications (Left): 8 mL lidocaine PF 1% 1 %; 80 mg triamcinolone acetonide 40 MG/ML  Outcome: tolerated well, no immediate complications  Procedure, treatment alternatives, risks and benefits explained, specific risks discussed. Consent was given by the patient. Immediately prior to procedure a time out was called to verify the " correct patient, procedure, equipment, support staff and site/side marked as required. Patient was prepped and draped in the usual sterile fashion.           Discussed with care with patient.  We will proceed with corticosteroid injections bilateral knees at today's do recommend application of ice injection site this evening we will plan to proceed with submission for viscosupplementation injections right knee.  We will gladly see her back in clinic when it is time to proceed with repeat Visco injection.  We will proceed with Celebrex once daily to see if this offers her any greater improvement discontinue all other anti-inflammatory medications.  All questions answered a visit.  Orders:  Orders Placed This Encounter   Procedures    - Large Joint Arthrocentesis: bilateral knee    XR Knee 3+ View With [place] Right    Visco Treatment     New Medications Ordered This Visit   Medications    celecoxib (CeleBREX) 200 MG capsule     Sig: Take 1 capsule by mouth Daily.     Dispense:  30 capsule     Refill:  5    lidocaine PF 1% (XYLOCAINE) injection 8 mL    lidocaine PF 1% (XYLOCAINE) injection 8 mL    triamcinolone acetonide (KENALOG-40) injection 80 mg    triamcinolone acetonide (KENALOG-40) injection 80 mg           Huan dictation utilized

## 2023-12-13 ENCOUNTER — PATIENT MESSAGE (OUTPATIENT)
Dept: ORTHOPEDIC SURGERY | Facility: CLINIC | Age: 53
End: 2023-12-13

## 2023-12-15 ENCOUNTER — OFFICE VISIT (OUTPATIENT)
Dept: ORTHOPEDIC SURGERY | Facility: CLINIC | Age: 53
End: 2023-12-15
Payer: COMMERCIAL

## 2023-12-15 VITALS — WEIGHT: 200 LBS | BODY MASS INDEX: 31.39 KG/M2 | HEIGHT: 67 IN

## 2023-12-15 DIAGNOSIS — M25.561 CHRONIC PAIN OF RIGHT KNEE: ICD-10-CM

## 2023-12-15 DIAGNOSIS — G89.29 CHRONIC PAIN OF RIGHT KNEE: ICD-10-CM

## 2023-12-15 DIAGNOSIS — M17.11 PRIMARY OSTEOARTHRITIS OF RIGHT KNEE: Primary | ICD-10-CM

## 2023-12-15 RX ADMIN — LIDOCAINE HYDROCHLORIDE 8 ML: 10 INJECTION, SOLUTION EPIDURAL; INFILTRATION; INTRACAUDAL; PERINEURAL at 14:03

## 2023-12-15 RX ADMIN — TRIAMCINOLONE ACETONIDE 80 MG: 40 INJECTION, SUSPENSION INTRA-ARTICULAR; INTRAMUSCULAR at 14:03

## 2023-12-15 NOTE — PROGRESS NOTES
Subjective:     Patient ID: Celsa Tom is a 53 y.o. female.    Chief Complaint:  Follow-up DJD right knee  Viscosupplementation injection right knee 2023  Corticosteroid injections bilateral knees 2023  History of Present Illness  Celsa Tom returns to clinic today for follow-up of her right knee increased swelling at the right knee with significant effusion denies known injury.  Denies any other concerns present.       Social History     Occupational History    Not on file   Tobacco Use    Smoking status: Never     Passive exposure: Never    Smokeless tobacco: Never   Vaping Use    Vaping Use: Never used   Substance and Sexual Activity    Alcohol use: Yes     Alcohol/week: 2.0 standard drinks of alcohol     Types: 2 Glasses of wine per week    Drug use: No    Sexual activity: Yes     Partners: Male     Birth control/protection: Surgical, OCP      Past Medical History:   Diagnosis Date    Ankle pain, left     Ankle sprain 1987    Arthritis of back 2018    Cervical disc disorder 2018    Heavy menstrual bleeding     Knee swelling 2018    Low back strain 2018    Migraine     PONV (postoperative nausea and vomiting)     Spinal headache     Tear of meniscus of knee 2018    Tendonitis     left ankle    Wears contact lenses      Past Surgical History:   Procedure Laterality Date     SECTION      x2    TOTAL LAPAROSCOPIC HYSTERECTOMY Bilateral 2018    Procedure: TOTAL LAPAROSCOPIC HYSTERECTOMY BILATERAL SALPINGECTOMY;  Surgeon: Nelia Olea MD;  Location: [facility] [place];  Service: Gynecology    TUBAL ABDOMINAL LIGATION         Family History   Problem Relation Age of Onset    Cancer Mother     Anesthesia problems Father     Cancer Father     Cancer Maternal Grandfather     Cancer Maternal Grandmother     Osteoporosis Maternal Grandmother     Cancer Paternal Grandfather     Malig Hyperthermia Neg Hx                Objective:  Physical Exam    General: No acute distress.  Eyes: conjunctiva  "clear; pupils equally round and reactive  ENT: external ears and nose atraumatic; oropharynx clear  CV: no peripheral edema  Resp: normal respiratory effort  Skin: no rashes or wounds; normal turgor  Psych: mood and affect appropriate; recent and remote memory intact    Vitals:    12/15/23 1353   Weight: 90.7 kg (200 lb)   Height: 170.2 cm (67\")         12/15/23  1353   Weight: 90.7 kg (200 lb)     Body mass index is 31.32 kg/m².      Right Knee Exam     Range of Motion   Extension:  0   Flexion:  120     Tests   Varus: negative Valgus: negative  Lachman:  Anterior - 1+      Patellar apprehension: positive    Other   Erythema: absent  Sensation: normal  Swelling: severe  Effusion: effusion present               Assessment:        1. Primary osteoarthritis of right knee    2. Chronic pain of right knee           Plan:    - Large Joint Arthrocentesis: R knee on 12/15/2023 2:03 PM  Indications: pain  Details: 22 G needle, superolateral approach  Medications: 80 mg triamcinolone acetonide 40 MG/ML; 8 mL lidocaine PF 1% 1 %  Outcome: tolerated well, no immediate complications  Procedure, treatment alternatives, risks and benefits explained, specific risks discussed. Consent was given by the patient. Immediately prior to procedure a time out was called to verify the correct patient, procedure, equipment, support staff and site/side marked as required. Patient was prepped and draped in the usual sterile fashion.         Discussed plan of care with patient we will proceed with aspiration and steroid injection right knee only we have submitted for viscosupplementation injections that can be completed toward the end of January.  We also discussed briefly repeating the MRI we will hold off for now and see if the injection provides her any significant improvement if it is not providing her improvement may discuss proceeding with advanced imaging versus waiting for the Visco injection.  Encouraged to call with any questions or " concerns.  All questions answered.  Orders:  Orders Placed This Encounter   Procedures    - Large Joint Arthrocentesis: R knee     No orders of the defined types were placed in this encounter.          Dragon dictation utilized

## 2023-12-16 RX ORDER — LIDOCAINE HYDROCHLORIDE 10 MG/ML
8 INJECTION, SOLUTION EPIDURAL; INFILTRATION; INTRACAUDAL; PERINEURAL
Status: COMPLETED | OUTPATIENT
Start: 2023-12-15 | End: 2023-12-15

## 2023-12-16 RX ORDER — TRIAMCINOLONE ACETONIDE 40 MG/ML
80 INJECTION, SUSPENSION INTRA-ARTICULAR; INTRAMUSCULAR
Status: COMPLETED | OUTPATIENT
Start: 2023-12-15 | End: 2023-12-15

## 2023-12-18 ENCOUNTER — PATIENT MESSAGE (OUTPATIENT)
Dept: ORTHOPEDIC SURGERY | Facility: CLINIC | Age: 53
End: 2023-12-18
Payer: COMMERCIAL

## 2023-12-19 ENCOUNTER — TELEPHONE (OUTPATIENT)
Dept: ORTHOPEDIC SURGERY | Facility: CLINIC | Age: 53
End: 2023-12-19
Payer: COMMERCIAL

## 2023-12-19 DIAGNOSIS — M25.561 MECHANICAL KNEE PAIN, RIGHT: Primary | ICD-10-CM

## 2023-12-19 NOTE — TELEPHONE ENCOUNTER
Provider: ABBI SEVILLA     Caller: PATIENT     Phone Number: [phone]    Reason for Call: PATIENT STATES ABBI ORDERED AN MRI OF HER RIGHT KNEE TODAY AND [facility] HAS AN OPENING 12-20-23. @ 8:30 PM AND THEY ARE ASKING IF INSURANCE HAS BEEN AUTHORIZED SO THEY CAN SCHEDULE HER. THEY WILL NEED AN ORDER AS WELL. PLEASE CALL TO DISCUSS. OKAY TO LEAVE A VOICEMAIL.     When was the patient last seen: 12-19-23

## 2023-12-21 RX ORDER — METHYLPREDNISOLONE 4 MG/1
TABLET ORAL
Qty: 21 TABLET | Refills: 0 | Status: SHIPPED | OUTPATIENT
Start: 2023-12-21

## 2023-12-28 ENCOUNTER — TELEPHONE (OUTPATIENT)
Dept: ORTHOPEDIC SURGERY | Facility: CLINIC | Age: 53
End: 2023-12-28
Payer: COMMERCIAL

## 2023-12-28 NOTE — TELEPHONE ENCOUNTER
Called and spoke with patient as per Maria C SANTANA- MRI of the Right knee was negative for any tears- it did show as previously discussed a lot of arthritis.       Patient aware and agreeable- she will call and scheduled once she returns home from her trip to [place].

## 2024-01-08 ENCOUNTER — PATIENT MESSAGE (OUTPATIENT)
Dept: ORTHOPEDIC SURGERY | Facility: CLINIC | Age: 54
End: 2024-01-08
Payer: COMMERCIAL

## 2024-01-12 ENCOUNTER — PATIENT MESSAGE (OUTPATIENT)
Dept: ORTHOPEDIC SURGERY | Facility: CLINIC | Age: 54
End: 2024-01-12
Payer: COMMERCIAL

## 2024-01-17 ENCOUNTER — PATIENT MESSAGE (OUTPATIENT)
Dept: ORTHOPEDIC SURGERY | Facility: CLINIC | Age: 54
End: 2024-01-17

## 2024-01-17 ENCOUNTER — TELEPHONE (OUTPATIENT)
Dept: ORTHOPEDIC SURGERY | Facility: CLINIC | Age: 54
End: 2024-01-17
Payer: COMMERCIAL

## 2024-01-17 NOTE — TELEPHONE ENCOUNTER
This patient called in following up on a request she sent in on 1-12-24 about a knee aspiration. She wanted to know if that would help, or if she should get a gel shot. She also asked if she can do both of those would she need separate appointments for them, or could we do them all in one appointment.     Please advise.

## 2024-01-18 ENCOUNTER — OFFICE VISIT (OUTPATIENT)
Dept: ORTHOPEDIC SURGERY | Facility: CLINIC | Age: 54
End: 2024-01-18
Payer: COMMERCIAL

## 2024-01-18 VITALS — HEIGHT: 67 IN | WEIGHT: 200 LBS | BODY MASS INDEX: 31.39 KG/M2

## 2024-01-18 DIAGNOSIS — M17.11 PRIMARY OSTEOARTHRITIS OF RIGHT KNEE: Primary | ICD-10-CM

## 2024-01-18 RX ORDER — CYCLOBENZAPRINE HCL 10 MG
10 TABLET ORAL NIGHTLY PRN
Qty: 30 TABLET | Refills: 0 | Status: SHIPPED | OUTPATIENT
Start: 2024-01-18

## 2024-01-18 RX ORDER — PREDNISONE 10 MG/1
TABLET ORAL
Qty: 39 TABLET | Refills: 0 | Status: SHIPPED | OUTPATIENT
Start: 2024-01-18 | End: 2024-01-18 | Stop reason: SDUPTHER

## 2024-01-18 RX ORDER — CYCLOBENZAPRINE HCL 10 MG
10 TABLET ORAL NIGHTLY PRN
Qty: 30 TABLET | Refills: 0 | Status: SHIPPED | OUTPATIENT
Start: 2024-01-18 | End: 2024-01-18 | Stop reason: SDUPTHER

## 2024-01-18 RX ORDER — LIDOCAINE HYDROCHLORIDE 10 MG/ML
4 INJECTION, SOLUTION EPIDURAL; INFILTRATION; INTRACAUDAL; PERINEURAL
Status: COMPLETED | OUTPATIENT
Start: 2024-01-18 | End: 2024-01-18

## 2024-01-18 RX ORDER — PREDNISONE 10 MG/1
TABLET ORAL
Qty: 39 TABLET | Refills: 0 | Status: SHIPPED | OUTPATIENT
Start: 2024-01-18 | End: 2024-01-30

## 2024-01-18 RX ADMIN — LIDOCAINE HYDROCHLORIDE 4 ML: 10 INJECTION, SOLUTION EPIDURAL; INFILTRATION; INTRACAUDAL; PERINEURAL at 15:18

## 2024-01-18 NOTE — PROGRESS NOTES
Subjective:     Patient ID: Celsa Tom is a 53 y.o. female.    Chief Complaint:  Follow-up DJD right knee  Corticosteroid injection right knee 12/15/2023  History of Present Illness  Celsa Tmo returns to clinic today for follow-up of her right knee.  Continue to experience maximal tenderness along the posterior lateral aspect of the knee.  She is experiencing significant swelling at the lateral aspect of the knee.  She did receive corticosteroid injection 12/15/2023 which did initially help decrease some of the inflammation but continues to experience pain worse with activities involving deep flexion but pretty constant along the posterior lateral aspect of the knee.  Denies any recent injury since last visit.  Denies any other concerns present.       Social History     Occupational History    Not on file   Tobacco Use    Smoking status: Never     Passive exposure: Never    Smokeless tobacco: Never   Vaping Use    Vaping Use: Never used   Substance and Sexual Activity    Alcohol use: Yes     Alcohol/week: 2.0 standard drinks of alcohol     Types: 2 Glasses of wine per week    Drug use: No    Sexual activity: Yes     Partners: Male     Birth control/protection: Surgical, OCP      Past Medical History:   Diagnosis Date    Ankle pain, left     Ankle sprain 1987    Arthritis of back 2018    Cervical disc disorder 2018    Heavy menstrual bleeding     Knee swelling 2018    Low back strain 2018    Migraine     PONV (postoperative nausea and vomiting)     Spinal headache     Tear of meniscus of knee 2018    Tendonitis     left ankle    Wears contact lenses      Past Surgical History:   Procedure Laterality Date     SECTION      x2    TOTAL LAPAROSCOPIC HYSTERECTOMY Bilateral 2018    Procedure: TOTAL LAPAROSCOPIC HYSTERECTOMY BILATERAL SALPINGECTOMY;  Surgeon: Nelia Olea MD;  Location: [facility] OR [facility] [place];  Service: Gynecology    TUBAL ABDOMINAL LIGATION         Family History   Problem Relation Age of  "Onset    Cancer Mother     Anesthesia problems Father     Cancer Father     Cancer Maternal Grandfather     Cancer Maternal Grandmother     Osteoporosis Maternal Grandmother     Cancer Paternal Grandfather     Malig Hyperthermia Neg Hx                Objective:  Physical Exam    General: No acute distress.  Eyes: conjunctiva clear; pupils equally round and reactive  ENT: external ears and nose atraumatic; oropharynx clear  CV: no peripheral edema  Resp: normal respiratory effort  Skin: no rashes or wounds; normal turgor  Psych: mood and affect appropriate; recent and remote memory intact    Vitals:    01/18/24 1455   Weight: 90.7 kg (200 lb)   Height: 170.2 cm (67\")         01/18/24  1455   Weight: 90.7 kg (200 lb)     Body mass index is 31.32 kg/m².      Right Knee Exam     Range of Motion   Extension:  0   Right knee flexion: 125.     Tests   Mackenzie:  Medial - positive Lateral - negative  Varus: negative Valgus: negative  Lachman:  Anterior - 1+      Patellar apprehension: positive    Other   Erythema: absent  Sensation: normal  Pulse: present  Swelling: severe    Comments:  Maximal tenderness present posterior lateral joint line            Assessment:        1. Primary osteoarthritis of right knee           Plan:  Large Joint Arthrocentesis: R knee  Date/Time: 1/18/2024 3:18 PM  Consent given by: patient  Site marked: site marked  Timeout: Immediately prior to procedure a time out was called to verify the correct patient, procedure, equipment, support staff and site/side marked as required   Supporting Documentation  Indications: pain   Procedure Details  Location: knee - R knee  Preparation: Patient was prepped and draped in the usual sterile fashion  Needle size: 18 G  Approach: superior  Medications administered: 4 mL lidocaine PF 1% 1 %  Aspirate amount: 2 mL  Patient tolerance: patient tolerated the procedure well with no immediate complications        We did discuss treatment options.  We did discuss " ultrasound-guided aspiration cystic structure posterior aspect of the knee however does come with possible complications.  We did discuss repeat aspiration today's visit right knee.  I do recommend application of ice to injection site this evening.  We will start oral steroid taper as well as Flexeril at night.  Will plan to see her back in clinic once Visco approved to start Visco series.  All questions answered.  Orders:  Orders Placed This Encounter   Procedures    Large Joint Arthrocentesis: R knee     New Medications Ordered This Visit   Medications    predniSONE (DELTASONE) 10 MG tablet     Sig: Take 6 tablets by mouth Daily for 3 days, THEN 4 tablets Daily for 3 days, THEN 2 tablets Daily for 3 days, THEN 1 tablet Daily for 3 days.     Dispense:  39 tablet     Refill:  0    cyclobenzaprine (FLEXERIL) 10 MG tablet     Sig: Take 1 tablet by mouth At Night As Needed for Muscle Spasms.     Dispense:  30 tablet     Refill:  0         Body mass index is 31.32 kg/m².      Dragon dictation utilized

## 2024-02-07 ENCOUNTER — TELEPHONE (OUTPATIENT)
Dept: ORTHOPEDIC SURGERY | Facility: CLINIC | Age: 54
End: 2024-02-07
Payer: COMMERCIAL

## 2024-02-07 NOTE — TELEPHONE ENCOUNTER
[facility] PAT[facility] AND SHE WANTS TO SEE WHAT HER COS ISFOR MONOVISC VS COST FOR GEL ONE [facility]OUGH [facility]

## 2024-02-19 ENCOUNTER — TELEPHONE (OUTPATIENT)
Dept: ORTHOPEDIC SURGERY | Facility: CLINIC | Age: 54
End: 2024-02-19
Payer: COMMERCIAL

## 2024-02-19 NOTE — TELEPHONE ENCOUNTER
TALKED WITH PATIENT REGARDING APPROVAL OF MONOVISC.  SHE STATED SHE GOT A LETTER IN THE MAIL BUT IT WAS CONFUSING.  I GAVE HER A NUMBER TO CALL TO INQUIRE ABOUT THE MEDICATION AND TOLD HER TO LET ME KNOW IF SHE IS GOING THAT ROUTE

## 2024-02-23 ENCOUNTER — PATIENT MESSAGE (OUTPATIENT)
Dept: ORTHOPEDIC SURGERY | Facility: CLINIC | Age: 54
End: 2024-02-23

## 2024-03-14 ENCOUNTER — CLINICAL SUPPORT (OUTPATIENT)
Dept: ORTHOPEDIC SURGERY | Facility: CLINIC | Age: 54
End: 2024-03-14
Payer: COMMERCIAL

## 2024-03-14 VITALS — HEIGHT: 67 IN | WEIGHT: 207.4 LBS | BODY MASS INDEX: 32.55 KG/M2

## 2024-03-14 DIAGNOSIS — M17.11 PRIMARY OSTEOARTHRITIS OF RIGHT KNEE: Primary | ICD-10-CM

## 2024-03-14 RX ORDER — VALACYCLOVIR HYDROCHLORIDE 500 MG/1
1 TABLET, FILM COATED ORAL DAILY
COMMUNITY
Start: 2024-02-29

## 2024-03-14 RX ORDER — HYALURONATE SODIUM, STABILIZED 60 MG/3 ML
SYRINGE (ML) INTRAARTICULAR
COMMUNITY
Start: 2024-02-24

## 2024-03-14 NOTE — PROGRESS NOTES
- Large Joint Arthrocentesis: R knee on 3/14/2024 1:34 PM  Indications: pain  Details: 21 G needle, anterolateral approach  Medications: 60 mg Sodium Hyaluronate 60 MG/3ML  Outcome: tolerated well, no immediate complications  Procedure, treatment alternatives, risks and benefits explained, specific risks discussed. Consent was given by the patient. Immediately prior to procedure a time out was called to verify the correct patient, procedure, equipment, support staff and site/side marked as required. Patient was prepped and draped in the usual sterile fashion.      Patient presents to clinic today for right knee viscosupplement injections.  This is the single injection of the series.  I explained details of injections as well as risks, benefits and alternatives with the patient today, had all questions answered, wished to proceed with injections.  I will see patient back in 6 weeks for re-evaluation. Patient was instructed to watch for signs or symptoms of infection including redness, swelling, warmth to the touch, or significant increased pain and to contact our office immediately if any of these issues were noted.

## 2024-04-08 ENCOUNTER — PROCEDURE VISIT (OUTPATIENT)
Age: 54
End: 2024-04-08
Payer: COMMERCIAL

## 2024-04-08 VITALS
HEIGHT: 67 IN | WEIGHT: 207 LBS | BODY MASS INDEX: 32.49 KG/M2 | DIASTOLIC BLOOD PRESSURE: 78 MMHG | SYSTOLIC BLOOD PRESSURE: 118 MMHG

## 2024-04-08 DIAGNOSIS — I78.1 SPIDER VEINS: Primary | ICD-10-CM

## 2024-04-08 PROCEDURE — COS225: Performed by: NURSE PRACTITIONER

## 2024-04-08 NOTE — PROGRESS NOTES
Procedure   Laser Vein Therapy    Date/Time: 4/8/2024 12:06 PM    Performed by: Liza Fernandez APRN  Authorized by: Liza Fernandez APRN    Procedure:  Laser vein therapy, legs  Laser:  Cutera Excel V 532 nm  Indications: spider veins    The procedure was performed for cosmetic reasons.        Anesthesia method:  None      Laser Location #1:  Right lower leg  Spot size (mm):  5  Fluence J/cm2:  9  Pulse width:  11  Pulse/ sec:  1  Passes:  1  Site 1 details:  Bilateral lower extremtities  Laser vein time, legs (min):  30  Specimen:  None      Procedure completion:  Tolerated  Post-procedure assessment: erythema    Post treatment instructions given to the patient: Yes     She will likely schedule injection sclerotherapy in the fall.

## 2024-04-26 ENCOUNTER — OFFICE VISIT (OUTPATIENT)
Dept: ORTHOPEDIC SURGERY | Facility: CLINIC | Age: 54
End: 2024-04-26
Payer: COMMERCIAL

## 2024-04-26 VITALS — HEIGHT: 67 IN | WEIGHT: 207 LBS | BODY MASS INDEX: 32.49 KG/M2

## 2024-04-26 DIAGNOSIS — M25.561 MECHANICAL KNEE PAIN, RIGHT: Primary | ICD-10-CM

## 2024-04-26 DIAGNOSIS — G89.29 CHRONIC PAIN OF RIGHT KNEE: ICD-10-CM

## 2024-04-26 DIAGNOSIS — M17.11 PRIMARY OSTEOARTHRITIS OF RIGHT KNEE: ICD-10-CM

## 2024-04-26 DIAGNOSIS — M25.561 CHRONIC PAIN OF RIGHT KNEE: ICD-10-CM

## 2024-04-26 DIAGNOSIS — M70.62 GREATER TROCHANTERIC BURSITIS OF LEFT HIP: ICD-10-CM

## 2024-04-26 RX ORDER — TRIAMCINOLONE ACETONIDE 40 MG/ML
80 INJECTION, SUSPENSION INTRA-ARTICULAR; INTRAMUSCULAR
Status: COMPLETED | OUTPATIENT
Start: 2024-04-26 | End: 2024-04-26

## 2024-04-26 RX ORDER — LIDOCAINE HYDROCHLORIDE 10 MG/ML
8 INJECTION, SOLUTION EPIDURAL; INFILTRATION; INTRACAUDAL; PERINEURAL
Status: COMPLETED | OUTPATIENT
Start: 2024-04-26 | End: 2024-04-26

## 2024-04-26 RX ORDER — LIDOCAINE HYDROCHLORIDE 10 MG/ML
4 INJECTION, SOLUTION EPIDURAL; INFILTRATION; INTRACAUDAL; PERINEURAL
Status: COMPLETED | OUTPATIENT
Start: 2024-04-26 | End: 2024-04-26

## 2024-04-26 RX ADMIN — TRIAMCINOLONE ACETONIDE 80 MG: 40 INJECTION, SUSPENSION INTRA-ARTICULAR; INTRAMUSCULAR at 10:02

## 2024-04-26 RX ADMIN — LIDOCAINE HYDROCHLORIDE 8 ML: 10 INJECTION, SOLUTION EPIDURAL; INFILTRATION; INTRACAUDAL; PERINEURAL at 10:02

## 2024-04-26 RX ADMIN — LIDOCAINE HYDROCHLORIDE 4 ML: 10 INJECTION, SOLUTION EPIDURAL; INFILTRATION; INTRACAUDAL; PERINEURAL at 10:02

## 2024-04-26 NOTE — PROGRESS NOTES
Subjective:     Patient ID: Celsa Tom is a 53 y.o. female.    Chief Complaint:  Follow-up right knee  Viscosupplementation injection 3/14/2024  History of Present Illness  Celsa Tom    The patient is a 53-year-old female who returns to clinic today for follow-up of her right knee.    The patient received a viscosupplementation injection on 03/14/2025 without significant symptom improvement. She continues to experience discomfort in the posterolateral aspect of the knee. In the interval since receiving the viscosupplementation injection, she has undergone treatment for her veins in her bilateral lower extremities, and she is doing quite well. She experiences right knee pain with extension and flexion, but she has observed some symptom improvement with stretching, particularly in warm water. She required time for her veins to heal before she could return to soaking and stretching while submerged in water. She has continued with strengthening exercises for the lower extremity. Additionally, she has been experiencing pain along the lateral aspect of the hip since her last visit on 03/14/2024. It is tender to the touch at the lateral aspect and radiates inferiorly. Pain does not radiate into her groin. She denies any other concerns at present.     Social History     Occupational History    Not on file   Tobacco Use    Smoking status: Never     Passive exposure: Never    Smokeless tobacco: Never   Vaping Use    Vaping status: Never Used   Substance and Sexual Activity    Alcohol use: Yes     Alcohol/week: 2.0 standard drinks of alcohol     Types: 2 Glasses of wine per week    Drug use: No    Sexual activity: Yes     Partners: Male     Birth control/protection: Surgical, OCP      Past Medical History:   Diagnosis Date    Ankle pain, left     Ankle sprain 1987    Arthritis of back 2018    Cervical disc disorder 2018    Dermatitis, unspecified 02/03/2022    Greater trochanteric bursitis of left hip 4/26/2024     "Heavy menstrual bleeding     History of mammogram 2020    Knee swelling 2018    Localized edema 2022    Low back strain 2018    Migraine     Nontoxic goiter, unspecified 2022    Osteoarthritis of knee 2020    PONV (postoperative nausea and vomiting)     Sciatica of right side 2020    Spider veins     Spinal headache     Tear of meniscus of knee 2018    Tendonitis     left ankle    Varicose veins of bilateral lower extremities with pain     And inflammation    Wears contact lenses      Past Surgical History:   Procedure Laterality Date     SECTION      x2    COLONOSCOPY  2019    TOTAL LAPAROSCOPIC HYSTERECTOMY Bilateral 2018    Procedure: TOTAL LAPAROSCOPIC HYSTERECTOMY BILATERAL SALPINGECTOMY;  Surgeon: Nelia Olea MD;  [place] [facility] [place] [facility] [place]  Service: Gynecology    TUBAL ABDOMINAL LIGATION         Family History   Problem Relation Age of Onset    Cancer Mother     Anesthesia problems Father     Cancer Father     Cancer Maternal Grandmother     Osteoporosis Maternal Grandmother     Cancer Maternal Grandfather     Cancer Paternal Grandfather     Malig Hyperthermia Neg Hx                Objective:  Physical Exam    General: No acute distress.  Eyes: conjunctiva clear; pupils equally round and reactive  ENT: external ears and nose atraumatic; oropharynx clear  CV: no peripheral edema  Resp: normal respiratory effort  Skin: no rashes or wounds; normal turgor  Psych: mood and affect appropriate; recent and remote memory intact    Vitals:    24 0944   Weight: 93.9 kg (207 lb)   Height: 170.2 cm (67.01\")         24  0944   Weight: 93.9 kg (207 lb)     Body mass index is 32.41 kg/m².      Right Knee Exam     Tenderness   The patient is experiencing tenderness in the patella.    Range of Motion   Extension:  0   Right knee flexion: 125.     Tests   Mackenzie:  Medial - negative Lateral - negative  Varus: negative Valgus: negative  Lachman:  Anterior - 1+    "   Drawer:  Anterior - negative      Patellar apprehension: positive    Other   Erythema: absent  Sensation: normal  Pulse: present  Swelling: moderate  Effusion: no effusion present    Comments:  Maximal tenderness present posterior lateral aspect of the knee, patella      Left Hip Exam     Tenderness   The patient is experiencing tenderness in the greater trochanter.    Range of Motion   Abduction:  45   Adduction:  25   Extension:  0   Flexion:  120   External rotation:  60   Internal rotation: 25     Muscle Strength   Abduction: 5/5   Adduction: 5/5   Flexion: 5/5     Tests   PAO: negative  Fadir:  Negative FADIR test    Other   Erythema: absent  Sensation: normal  Pulse: present             Assessment:        1. Mechanical knee pain, right    2. Primary osteoarthritis of right knee    3. Chronic pain of right knee    4. Greater trochanteric bursitis of left hip           Plan:  Plan of care was discussed with the patient. A corticosteroid injection will be administered via an anterolateral approach, which is a new approach for the patient. Application of ice to the injection site this evening is recommended. It may take 3 to 7 days before she experiences significant symptom improvement. She will also receive a corticosteroid injection along the greater trochanter of the left hip. Again, it is recommended that she apply ice to the injection site this evening. A referral to physical therapy will be made for evaluation of the right lower extremity.   Patient would like to proceed with cortisone injection today to the right knee and left hip. Recommended limited use of affected extremity for the next 24 hours to only essential activities other than work on general active and passive motion. Recommended supplementing with ice and soft tissue massage. Discussed with patient that they should see results in 5-7 days, if no improvement in 5-6 weeks I have asked them to call the office to review other options. Patient  should call office immediately if they notice redness, warmth, fevers, chills, or residual numbness or tingling for greater than 6 hours after injection.  The patient is scheduled for a follow-up visit in the clinic in 3 months or sooner if needed. She is encouraged to reach out with any questions or concerns. All questions were addressed.  Large Joint Arthrocentesis: R knee  Date/Time: 4/26/2024 10:02 AM  Consent given by: patient  Site marked: site marked  Timeout: Immediately prior to procedure a time out was called to verify the correct patient, procedure, equipment, support staff and site/side marked as required   Supporting Documentation  Indications: pain   Procedure Details  Location: knee - R knee  Preparation: Patient was prepped and draped in the usual sterile fashion  Needle size: 22 G  Approach: anterolateral  Medications administered: 8 mL lidocaine PF 1% 1 %; 80 mg triamcinolone acetonide 40 MG/ML  Patient tolerance: patient tolerated the procedure well with no immediate complications      Large Joint Arthrocentesis: L greater trochanteric bursa  Date/Time: 4/26/2024 10:02 AM  Consent given by: patient  Site marked: site marked  Timeout: Immediately prior to procedure a time out was called to verify the correct patient, procedure, equipment, support staff and site/side marked as required   Supporting Documentation  Indications: pain   Procedure Details  Location: hip - L greater trochanteric bursa  Preparation: Patient was prepped and draped in the usual sterile fashion  Needle size: 22 G  Approach: lateral  Medications administered: 4 mL lidocaine PF 1% 1 %; 80 mg triamcinolone acetonide 40 MG/ML  Patient tolerance: patient tolerated the procedure well with no immediate complications          Orders:  Orders Placed This Encounter   Procedures    Large Joint Arthrocentesis: R knee    Large Joint Arthrocentesis: L greater trochanteric bursa    Ambulatory Referral to Physical Therapy     No orders of  the defined types were placed in this encounter.          Dragon dictation utilized    Transcribed from ambient dictation for ESTHER Lee by Kami Tamayo.  04/26/24   13:24 EDT    Patient or patient representative verbalized consent to the visit recording.  I have personally performed the services described in this document as transcribed by the above individual, and it is both accurate and complete.

## 2024-07-03 ENCOUNTER — CLINICAL SUPPORT (OUTPATIENT)
Dept: ORTHOPEDIC SURGERY | Facility: CLINIC | Age: 54
End: 2024-07-03
Payer: COMMERCIAL

## 2024-07-03 VITALS — HEIGHT: 67 IN | BODY MASS INDEX: 32.15 KG/M2 | WEIGHT: 204.8 LBS

## 2024-07-03 DIAGNOSIS — M17.11 PRIMARY OSTEOARTHRITIS OF RIGHT KNEE: Primary | ICD-10-CM

## 2024-07-03 RX ORDER — TRIAMCINOLONE ACETONIDE 40 MG/ML
80 INJECTION, SUSPENSION INTRA-ARTICULAR; INTRAMUSCULAR
Status: COMPLETED | OUTPATIENT
Start: 2024-07-03 | End: 2024-07-03

## 2024-07-03 RX ORDER — LIDOCAINE HYDROCHLORIDE 10 MG/ML
8 INJECTION, SOLUTION EPIDURAL; INFILTRATION; INTRACAUDAL; PERINEURAL
Status: COMPLETED | OUTPATIENT
Start: 2024-07-03 | End: 2024-07-03

## 2024-07-03 RX ADMIN — LIDOCAINE HYDROCHLORIDE 8 ML: 10 INJECTION, SOLUTION EPIDURAL; INFILTRATION; INTRACAUDAL; PERINEURAL at 09:42

## 2024-07-03 RX ADMIN — TRIAMCINOLONE ACETONIDE 80 MG: 40 INJECTION, SUSPENSION INTRA-ARTICULAR; INTRAMUSCULAR at 09:42

## 2024-07-03 NOTE — PROGRESS NOTES
Large Joint Arthrocentesis: R knee  Date/Time: 7/3/2024 9:42 AM  Consent given by: patient  Site marked: site marked  Timeout: Immediately prior to procedure a time out was called to verify the correct patient, procedure, equipment, support staff and site/side marked as required   Supporting Documentation  Indications: pain   Procedure Details  Location: knee - R knee  Preparation: Patient was prepped and draped in the usual sterile fashion  Needle size: 22 G  Approach: superior  Medications administered: 8 mL lidocaine PF 1% 1 %; 80 mg triamcinolone acetonide 40 MG/ML  Patient tolerance: patient tolerated the procedure well with no immediate complications      Returns to clinic today for corticosteroid injection right knee tolerated procedure well I do recommend location of ice injection site this evening we will plan to see her back in clinic as needed all questions answered to his visit.

## 2024-07-29 ENCOUNTER — OFFICE VISIT (OUTPATIENT)
Dept: ORTHOPEDIC SURGERY | Facility: CLINIC | Age: 54
End: 2024-07-29
Payer: COMMERCIAL

## 2024-07-29 VITALS — HEIGHT: 67 IN | WEIGHT: 204 LBS | BODY MASS INDEX: 32.02 KG/M2

## 2024-07-29 DIAGNOSIS — M25.561 CHRONIC PAIN OF RIGHT KNEE: ICD-10-CM

## 2024-07-29 DIAGNOSIS — M25.561 MECHANICAL KNEE PAIN, RIGHT: ICD-10-CM

## 2024-07-29 DIAGNOSIS — M17.11 PRIMARY OSTEOARTHRITIS OF RIGHT KNEE: Primary | ICD-10-CM

## 2024-07-29 DIAGNOSIS — G89.29 CHRONIC PAIN OF RIGHT KNEE: ICD-10-CM

## 2024-07-29 PROCEDURE — 99214 OFFICE O/P EST MOD 30 MIN: CPT | Performed by: NURSE PRACTITIONER

## 2024-07-29 RX ORDER — NAPROXEN SODIUM 220 MG
220 TABLET ORAL 2 TIMES DAILY PRN
COMMUNITY

## 2024-07-29 RX ORDER — CELECOXIB 200 MG/1
200 CAPSULE ORAL DAILY
Qty: 30 CAPSULE | Refills: 3 | Status: SHIPPED | OUTPATIENT
Start: 2024-07-29

## 2024-07-29 NOTE — PROGRESS NOTES
Subjective:     Patient ID: Celsa Tom is a 53 y.o. female.    Chief Complaint:  Follow-up DJD right knee  Follow-up mechanical knee symptoms  Corticosteroid injection right knee 7/3/2024  History of Present Illness  History of Present Illness  The patient presents to clinic today for follow-up of her right knee. She received corticosteroid injection on 07/03/2024. Prior viscosupplementation injection was in 03/2024.    She continues to experience swelling and pain in the knee. Despite trying Pilates, which seemed to provide some symptom improvement, she was also participating with physical therapy at an external facility. She was not scheduled for follow-up after her most recent visit, presents today for re-evaluation of the knee. She continues to experience pain along the anterior and anterolateral aspects of the knee, which increases with prolonged walking or standing for extended periods. She also reports a sensation of weakness and occasional instability in the knee. She denies any other concerns.         Social History     Occupational History    Not on file   Tobacco Use    Smoking status: Never     Passive exposure: Never    Smokeless tobacco: Never   Vaping Use    Vaping status: Never Used   Substance and Sexual Activity    Alcohol use: Yes     Alcohol/week: 2.0 standard drinks of alcohol     Types: 2 Glasses of wine per week    Drug use: No    Sexual activity: Yes     Partners: Male     Birth control/protection: Surgical, OCP      Past Medical History:   Diagnosis Date    Ankle pain, left     Ankle sprain 1987    Arthritis of back 2018    Cervical disc disorder 2018    Dermatitis, unspecified 02/03/2022    Greater trochanteric bursitis of left hip 4/26/2024    Heavy menstrual bleeding     History of mammogram 03/2020    Knee swelling 2018    Localized edema 02/03/2022    Low back strain 2018    Migraine     Nontoxic goiter, unspecified 02/03/2022    Osteoarthritis of knee 11/11/2020    PONV  "(postoperative nausea and vomiting)     Sciatica of right side 2020    Spider veins     Spinal headache     Tear of meniscus of knee 2018    Tendonitis     left ankle    Varicose veins of bilateral lower extremities with pain     And inflammation    Wears contact lenses      Past Surgical History:   Procedure Laterality Date     SECTION      x2    COLONOSCOPY  2019    TOTAL LAPAROSCOPIC HYSTERECTOMY Bilateral 2018    Procedure: TOTAL LAPAROSCOPIC HYSTERECTOMY BILATERAL SALPINGECTOMY;  Surgeon: Nelia Olea MD;  Location: [facility] OR [facility] [place];  Service: Gynecology    TUBAL ABDOMINAL LIGATION         Family History   Problem Relation Age of Onset    Cancer Mother     Anesthesia problems Father     Cancer Father     Cancer Maternal Grandmother     Osteoporosis Maternal Grandmother     Cancer Maternal Grandfather     Cancer Paternal Grandfather     Malig Hyperthermia Neg Hx                Objective:  Physical Exam    General: No acute distress.  Eyes: conjunctiva clear; pupils equally round and reactive  ENT: external ears and nose atraumatic; oropharynx clear  CV: no peripheral edema  Resp: normal respiratory effort  Skin: no rashes or wounds; normal turgor  Psych: mood and affect appropriate; recent and remote memory intact    Vitals:    24 0933   Weight: 92.5 kg (204 lb)   Height: 170.2 cm (67.01\")         24  0933   Weight: 92.5 kg (204 lb)     Body mass index is 31.94 kg/m².      Right Knee Exam     Tenderness   The patient is experiencing tenderness in the lateral joint line and patella.    Range of Motion   Extension:  0   Right knee flexion: 125.     Tests   Lachman:  Anterior - 1+      Patellar apprehension: positive    Other   Erythema: absent  Sensation: normal  Pulse: present  Swelling: moderate  Effusion: no effusion present    Comments:  Positive crepitus throughout arc of motion  Positive active patellar compression test             Physical Exam      Assessment:        1. " Primary osteoarthritis of right knee    2. Mechanical knee pain, right    3. Chronic pain of right knee         Assessment & Plan  1. Right knee pain.  Discussed plan of care with patient. We will proceed with PT, prefers to work with Chandan Perez. I will plan to resubmit for viscosupplementation injections. Plan to see her back in clinic after viscosupplementation approved to start series, gladly see her sooner if needed. All questions answered.      Orders:  Orders Placed This Encounter   Procedures    Ambulatory Referral to Physical Therapy    Visco Treatment     New Medications Ordered This Visit   Medications    celecoxib (CeleBREX) 200 MG capsule     Sig: Take 1 capsule by mouth Daily.     Dispense:  30 capsule     Refill:  3             Dragon dictation utilized          Patient or patient representative verbalized consent for the use of Ambient Listening during the visit with  ESTHER Lee for chart documentation. 7/29/2024  11:12 EDT

## 2024-08-06 ENCOUNTER — TELEPHONE (OUTPATIENT)
Dept: ORTHOPEDIC SURGERY | Facility: CLINIC | Age: 54
End: 2024-08-06

## 2024-08-06 NOTE — TELEPHONE ENCOUNTER
Caller: Celsa Tom    Relationship to patient: Self    Best call back number: [phone] (home)     Patient is needing: PATIENT CALLED STATING SHE MISSED TWO CALLS FROM THE OFFICE AND WASN'T SURE WHAT IT WAS ABOUT - PLEASE ADVISE IF OFFICE NEEDS TO SPEAK TO PATIENT

## 2024-08-09 ENCOUNTER — HOSPITAL ENCOUNTER (OUTPATIENT)
Dept: PHYSICAL THERAPY | Facility: HOSPITAL | Age: 54
Setting detail: THERAPIES SERIES
Discharge: HOME | End: 2024-08-09
Payer: COMMERCIAL

## 2024-08-09 DIAGNOSIS — M25.561 MECHANICAL PAIN OF RIGHT KNEE: ICD-10-CM

## 2024-08-09 DIAGNOSIS — M17.11 PRIMARY OSTEOARTHRITIS OF RIGHT KNEE: Primary | ICD-10-CM

## 2024-08-09 PROCEDURE — 97161 PT EVAL LOW COMPLEX 20 MIN: CPT | Performed by: PHYSICAL THERAPIST

## 2024-08-09 PROCEDURE — 97110 THERAPEUTIC EXERCISES: CPT | Performed by: PHYSICAL THERAPIST

## 2024-08-09 NOTE — THERAPY EVALUATION
Outpatient Physical Therapy Ortho Initial Evaluation   [place]     Patient Name: Celsa Tom  : 1970  MRN: [id number]  Today's Date: 2024      Visit Date: 2024    Patient Active Problem List   Diagnosis    Uterine adenomyoma    Osteoarthritis of patellofemoral joints of both knees    Mechanical knee pain, right    Tear of lateral meniscus of right knee    Primary osteoarthritis of right knee    Primary osteoarthritis of left knee    Spider veins    Greater trochanteric bursitis of left hip        Past Medical History:   Diagnosis Date    Ankle pain, left     Ankle sprain 1987    Arthritis of back 2018    Cervical disc disorder 2018    Dermatitis, unspecified 2022    Greater trochanteric bursitis of left hip 2024    Heavy menstrual bleeding     History of mammogram 2020    Knee swelling 2018    Localized edema 2022    Low back strain 2018    Migraine     Nontoxic goiter, unspecified 2022    Osteoarthritis of knee 2020    PONV (postoperative nausea and vomiting)     Sciatica of right side 2020    Spider veins     Spinal headache     Tear of meniscus of knee 2018    Tendonitis     left ankle    Varicose veins of bilateral lower extremities with pain     And inflammation    Wears contact lenses         Past Surgical History:   Procedure Laterality Date     SECTION      x2    COLONOSCOPY  2019    TOTAL LAPAROSCOPIC HYSTERECTOMY Bilateral 2018    Procedure: TOTAL LAPAROSCOPIC HYSTERECTOMY BILATERAL SALPINGECTOMY;  Surgeon: Nelia Olea MD;  Location: [facility] OR [facility] [place];  Service: Gynecology    TUBAL ABDOMINAL LIGATION         Visit Dx:     ICD-10-CM ICD-9-CM   1. Primary osteoarthritis of right knee  M17.11 715.16   2. Mechanical pain of right knee  M25.561 719.46          Patient History       Row Name 24 0950             History    Chief Complaint Pain;Difficulty with daily activities;Swelling  -GC      Type of Pain Knee pain  right   -GC      Brief Description of Current Complaint Pt reports a significant history of right knee pain for which she has received multiple cortisone and gel injections. She has recently developed swelling and pain that has persisted. She started therapy at another facility, but her pain was not improving. She is now referred here for therapy.  -GC      Patient/Caregiver Goals Relieve pain;Return to prior level of function;Improve mobility;Improve strength  -      Occupation/sports/leisure activities pt enjoys pilates and walking  -      What clinical tests have you had for this problem? X-ray;MRI  -GC      Results of Clinical Tests OA  -GC         Pain     Pain Location Knee  right  -GC      Pain at Best 0  no pain at rest  -GC      Pain at Worst 5;6  -GC      What Performance Factors Make the Current Problem(s) WORSE? Pt has some difficulty with stairs, squatting, rising from seated position  -GC      What Performance Factors Make the Current Problem(s) BETTER? pt feels best at rest  -GC      Difficulties with ADL's? Pt has some difficulty with stairs, squatting, rising from seated position  -         Daily Activities    Primary Language English  -GC      Are you able to read Yes  -GC      Are you able to write Yes  -GC      How does patient learn best? Reading  -      Teaching needs identified Home Exercise Program;Management of Condition  -GC      Patient is concerned about/has problems with Climbing Stairs;Flexibility;Performing home management (household chores, shopping, care of dependents);Performing job responsibilities/community activities (work, school,;Standing;Walking  -GC      Does patient have problems with the following? None  -GC      Barriers to learning None  -GC      Functional Status bathing;dressing;mobility issues preventing performance of daily activities  -GC      Pt Participated in POC and Goals Yes  -GC         Safety    Are you being hurt, hit, or frightened by anyone at home or in  your life? No  -GC      Are you being neglected by a caregiver No  -GC                User Key  (r) = Recorded By, (t) = Taken By, (c) = Cosigned By      Initials Name Provider Type    GC Chandan Larsen, JOSE Physical Therapist                     PT Ortho       Row Name 08/09/24 0995       Posture/Observations    Posture/Observations Comments Pt has mild edema right knee with Bakers cyst posterior knee.  -GC       Knee Palpation    Patella Right:;Tender  -GC    Medial Joint Line Right:;Tender  -GC    Lateral Joint Line Right:;Tender  -GC       Patellar Accessory Motions    Superior glide Right:;WNL  -GC    Inferior glide Right:;WNL  -GC    Medial glide Right:;WNL  -GC    Lateral glide Right:;WNL  -GC       Knee Special Tests    Anterior drawer (ACL lesion) Right:;Negative  -GC    Posterior drawer (PCL lesion) Right:;Negative  -GC    Valgus stress (MCL lesion) Right:;Negative  -GC    Varus stress (LCL lesion) Right:;Negative  -GC    Mackenzie’s test (meniscal lesion) Right:;Negative  -GC    Bounce home test (meniscal lesion) Right:;Negative  -GC    Patellar grind test (chondromalacia patella) Right:;Positive  -GC       Right Lower Ext    Rt Knee Extension/Flexion AROM 0-2-127 degrees  -GC       MMT Right Lower Ext    Rt Hip Flexion MMT, Gross Movement (4/5) good  -GC    Rt Hip Extension MMT, Gross Movement (4+/5) good plus  -GC    Rt Hip ABduction MMT, Gross Movement (5/5) normal  -GC    Rt Knee Extension MMT, Gross Movement (4/5) good  -GC    Rt Knee Flexion MMT, Gross Movement (4/5) good  -GC    Rt Ankle Plantarflexion MMT, Gross Movement (5/5) normal  -GC    Rt Ankle Dorsiflexion MMT, Gross Movement (5/5) normal  -GC       Sensation    Light Touch No apparent deficits  -GC       Transfers    Comment, (Transfers) Pt is independent with all bed mobility and transfers  -GC       Gait/Stairs (Locomotion)    Comment, (Gait/Stairs) Pt ambulates with mild antalgic gait right LE  -GC              User Key  (r) = Recorded  By, (t) = Taken By, (c) = Cosigned By      Initials Name Provider Type     Chandan Larsen, PT Physical Therapist                                Therapy Education  Given: HEP  Program: New  How Provided: Verbal, Demonstration, Written  Provided to: Patient  Level of Understanding: Teach back education performed, Verbalized, Demonstrated      PT OP Goals       Row Name 08/09/24 0950          PT Short Term Goals    STG Date to Achieve 08/23/24  -     STG 1 Decrease right knee pain to 2-3/10 with activity.  -GC     STG 2 Increase right knee ROM to 0-130 degrees with testing.  -GC     STG 3 Increase right LE strength to at least 4+/5 all planes with testing.  -     STG 4 Pt will be independent with her HEP issued by this therapist.  -        Long Term Goals    LTG Date to Achieve 09/06/24  -     LTG 1 Decrease right knee pain to 0-1/10 with activity.  -GC     LTG 2 Increase right LE strength to 5/5 all planes with testing.  -     LTG 3 Pt will be independent with all ADLs without pain.  -        Time Calculation    PT Goal Re-Cert Due Date 09/06/24  -               User Key  (r) = Recorded By, (t) = Taken By, (c) = Cosigned By      Initials Name Provider Type     Chandan Larsen PT Physical Therapist                     PT Assessment/Plan       Row Name 08/09/24 0950          PT Assessment    Functional Limitations Impaired gait;Limitation in home management;Limitations in community activities;Limitations in functional capacity and performance;Performance in leisure activities  -     Impairments Range of motion;Pain;Muscle strength;Impaired flexibility;Gait;Edema  -     Assessment Comments Pt presents with a significant history of right knee pain that she currently rates up to 5-6/10 with activity. She has decreased right knee extension ROM, decreased hamstring flexibility, decreased right LE strength, decreased ambualtory status, and decreased function secondary to the above.  -     Rehab  Potential Good  -GC     Patient/caregiver participated in establishment of treatment plan and goals Yes  -GC     Patient would benefit from skilled therapy intervention Yes  -GC        PT Plan    PT Frequency 1x/week;2x/week  -GC     Predicted Duration of Therapy Intervention (PT) 4 weeks  -GC     Planned CPT's? PT EVAL LOW COMPLEXITY: 34688;PT THER PROC EA 15 MIN: 47933;PT ELECTRICAL STIM UNATTEND: ;PT IONTOPHORESIS EA 15 MIN: 44210  -GC     PT Plan Comments Pt is to continue her HEP 2x daily.  -GC               User Key  (r) = Recorded By, (t) = Taken By, (c) = Cosigned By      Initials Name Provider Type    GC Chandan Larsen, PT Physical Therapist                     Modalities       Row Name 08/09/24 0950             Iontophoresis 43441    Milliamps 3  -GC      MA/Min 40  -GC      Dexamethasone used Yes  -GC      Patch Type Medium  right bakers cyst area  -GC      14094 - PT Iontophoresis Minutes 13  -GC                User Key  (r) = Recorded By, (t) = Taken By, (c) = Cosigned By      Initials Name Provider Type    GC Chandan Larsen, PT Physical Therapist                   OP Exercises       Row Name 08/09/24 0950             Exercise 1    Exercise Name 1 Hamstring stretch  -GC      Reps 1 15  -GC      Time 1 10 secs  -GC         Exercise 2    Exercise Name 2 QS with Russian stim  -GC      Time 2 10 min 10/10  -GC         Exercise 3    Exercise Name 3 SAQ  -GC      Reps 3 50  -GC      Time 3 1#  -GC         Exercise 4    Exercise Name 4 LAQ/ball squeeze  -GC      Reps 4 50  -GC      Time 4 1#  -GC         Exercise 5    Exercise Name 5 TKE vs theraband  -GC      Reps 5 50  -GC      Time 5 silver  -GC                User Key  (r) = Recorded By, (t) = Taken By, (c) = Cosigned By      Initials Name Provider Type    GC Chandan Larsen, PT Physical Therapist                                  Outcome Measure Options: Lower Extremity Functional Scale (LEFS)  Lower Extremity Functional Index  Any of your usual work,  housework or school activities: A little bit of difficulty  Your usual hobbies, recreational or sporting activities: Moderate difficulty  Getting into or out of the bath: A little bit of difficulty  Walking between rooms: A little bit of difficulty  Putting on your shoes or socks: A little bit of difficulty  Squatting: Moderate difficulty  Lifting an object, like a bag of groceries from the floor: No difficulty  Performing light activities around your home: A little bit of difficulty  Performing heavy activities around your home: A little bit of difficulty  Getting into or out of a car: A little bit of difficulty  Walking 2 blocks: A little bit of difficulty  Walking a mile: A little bit of difficulty  Going up or down 10 stairs (about 1 flight of stairs): A little bit of difficulty  Standing for 1 hour: A little bit of difficulty  Sitting for 1 hour: A little bit of difficulty  Running on even ground: Moderate difficulty  Running on uneven ground: Moderate difficulty  Making sharp turns while running fast: Moderate difficulty  Hopping: Moderate difficulty  Rolling over in bed: A little bit of difficulty  Total: 55  Lower Extremity Functional Index  Any of your usual work, housework or school activities: A little bit of difficulty  Your usual hobbies, recreational or sporting activities: Moderate difficulty  Getting into or out of the bath: A little bit of difficulty  Walking between rooms: A little bit of difficulty  Putting on your shoes or socks: A little bit of difficulty  Squatting: Moderate difficulty  Lifting an object, like a bag of groceries from the floor: No difficulty  Performing light activities around your home: A little bit of difficulty  Performing heavy activities around your home: A little bit of difficulty  Getting into or out of a car: A little bit of difficulty  Walking 2 blocks: A little bit of difficulty  Walking a mile: A little bit of difficulty  Going up or down 10 stairs (about 1 flight of  stairs): A little bit of difficulty  Standing for 1 hour: A little bit of difficulty  Sitting for 1 hour: A little bit of difficulty  Running on even ground: Moderate difficulty  Running on uneven ground: Moderate difficulty  Making sharp turns while running fast: Moderate difficulty  Hopping: Moderate difficulty  Rolling over in bed: A little bit of difficulty  Total: 55      Time Calculation:     Start Time: 0950  Stop Time: 1055  Time Calculation (min): 65 min  Timed Charges  84750 - PT Iontophoresis Minutes: 13  Total Minutes  Timed Charges Total Minutes: 13   Total Minutes: 13     Therapy Charges for Today       Code Description Service Date Service Provider Modifiers Qty    91224741445 HC PT THER PROC EA 15 MIN 8/9/2024 Chandan Larsen, PT GP 1    27907062481 HC PT EVAL LOW COMPLEXITY 2 8/9/2024 Chandan Larsen, PT GP 1            PT G-Codes  Outcome Measure Options: Lower Extremity Functional Scale (LEFS)  Total: 55         Chandan Larsen, PT  8/9/2024

## 2024-08-14 ENCOUNTER — HOSPITAL ENCOUNTER (OUTPATIENT)
Dept: PHYSICAL THERAPY | Facility: HOSPITAL | Age: 54
Setting detail: THERAPIES SERIES
Discharge: HOME | End: 2024-08-14
Payer: COMMERCIAL

## 2024-08-14 DIAGNOSIS — M25.561 MECHANICAL PAIN OF RIGHT KNEE: ICD-10-CM

## 2024-08-14 DIAGNOSIS — M17.11 PRIMARY OSTEOARTHRITIS OF RIGHT KNEE: Primary | ICD-10-CM

## 2024-08-14 PROCEDURE — 97110 THERAPEUTIC EXERCISES: CPT | Performed by: PHYSICAL THERAPIST

## 2024-08-14 NOTE — THERAPY TREATMENT NOTE
Outpatient Physical Therapy Ortho Treatment Note   [facility]     Patient Name: Celsa Tom  : 1970  MRN: [id number]  Today's Date: 2024      Visit Date: 2024    Visit Dx:    ICD-10-CM ICD-9-CM   1. Primary osteoarthritis of right knee  M17.11 715.16   2. Mechanical pain of right knee  M25.561 719.46       Patient Active Problem List   Diagnosis    Uterine adenomyoma    Osteoarthritis of patellofemoral joints of both knees    Mechanical knee pain, right    Tear of lateral meniscus of right knee    Primary osteoarthritis of right knee    Primary osteoarthritis of left knee    Spider veins    Greater trochanteric bursitis of left hip        Past Medical History:   Diagnosis Date    Ankle pain, left     Ankle sprain 1987    Arthritis of back 2018    Cervical disc disorder 2018    Dermatitis, unspecified 2022    Greater trochanteric bursitis of left hip 2024    Heavy menstrual bleeding     History of mammogram 2020    Knee swelling 2018    Localized edema 2022    Low back strain 2018    Migraine     Nontoxic goiter, unspecified 2022    Osteoarthritis of knee 2020    PONV (postoperative nausea and vomiting)     Sciatica of right side 2020    Spider veins     Spinal headache     Tear of meniscus of knee 2018    Tendonitis     left ankle    Varicose veins of bilateral lower extremities with pain     And inflammation    Wears contact lenses         Past Surgical History:   Procedure Laterality Date     SECTION      x2    COLONOSCOPY  2019    TOTAL LAPAROSCOPIC HYSTERECTOMY Bilateral 2018    Procedure: TOTAL LAPAROSCOPIC HYSTERECTOMY BILATERAL SALPINGECTOMY;  Surgeon: Nelia Olea MD;  [place] [facility] [place] [facility] [place]  Service: Gynecology    TUBAL ABDOMINAL LIGATION                          PT Assessment/Plan       Row Name 24 1100          PT Assessment    Assessment Comments Pt is doing well wiht decreased symptoms and good toelrance to her  exercise progression.  -GC        PT Plan    PT Plan Comments Pt is to continue her HEP 1-2x daily.  -GC               User Key  (r) = Recorded By, (t) = Taken By, (c) = Cosigned By      Initials Name Provider Type    GC Chandan Larsen, PT Physical Therapist                     Modalities       Row Name 08/14/24 1100             Iontophoresis 48980    Milliamps 4  -GC      MA/Min 40  -GC      Dexamethasone used Yes  -GC      Patch Type Medium  right bakers cyst area  -GC      06703 - PT Iontophoresis Minutes 10  -GC         Functional Testing    Outcome Measure Options Lower Extremity Functional Scale (LEFS)  -GC                User Key  (r) = Recorded By, (t) = Taken By, (c) = Cosigned By      Initials Name Provider Type    Chandan Strong, PT Physical Therapist                   OP Exercises       Row Name 08/14/24 1100             Subjective    Subjective Comments Pt states her cyst feels better, but her knee cap is really popping and cracking.  -GC         Exercise 1    Exercise Name 1 Hamstring stretch  -GC      Reps 1 15  -GC      Time 1 10 secs  -GC         Exercise 2    Exercise Name 2 QS with Russian stim  -GC      Time 2 10 min 10/10  -GC         Exercise 3    Exercise Name 3 SAQ  -GC      Reps 3 50  -GC      Time 3 2#  -GC         Exercise 4    Exercise Name 4 LAQ/ball squeeze  -GC      Reps 4 50  -GC      Time 4 1#  -GC         Exercise 5    Exercise Name 5 TKE vs theraband  -GC      Reps 5 50  -GC      Time 5 silver  -GC         Exercise 6    Exercise Name 6 SLR  -GC      Reps 6 25  -GC                User Key  (r) = Recorded By, (t) = Taken By, (c) = Cosigned By      Initials Name Provider Type    Chandan Strong, PT Physical Therapist                                          Outcome Measure Options: Lower Extremity Functional Scale (LEFS)         Time Calculation:   Start Time: 1100  Stop Time: 1154  Time Calculation (min): 54 min  Timed Charges  48661 - PT Iontophoresis Minutes: 10  Total  Minutes  Timed Charges Total Minutes: 10   Total Minutes: 10  Therapy Charges for Today       Code Description Service Date Service Provider Modifiers Qty    01239787276 HC PT THER PROC EA 15 MIN 8/14/2024 Chandan Larsen, PT GP 1            PT G-Codes  Outcome Measure Options: Lower Extremity Functional Scale (LEFS)         Chandan Larsen, PT  8/14/2024

## 2024-08-21 ENCOUNTER — HOSPITAL ENCOUNTER (OUTPATIENT)
Dept: PHYSICAL THERAPY | Facility: HOSPITAL | Age: 54
Setting detail: THERAPIES SERIES
Discharge: HOME | End: 2024-08-21
Payer: COMMERCIAL

## 2024-08-21 DIAGNOSIS — M17.11 PRIMARY OSTEOARTHRITIS OF RIGHT KNEE: Primary | ICD-10-CM

## 2024-08-21 DIAGNOSIS — M25.561 MECHANICAL PAIN OF RIGHT KNEE: ICD-10-CM

## 2024-08-21 PROCEDURE — 97110 THERAPEUTIC EXERCISES: CPT | Performed by: PHYSICAL THERAPIST

## 2024-08-21 NOTE — THERAPY TREATMENT NOTE
Outpatient Physical Therapy Ortho Treatment Note   [facility]     Patient Name: Celsa Tom  : 1970  MRN: [id number]  Today's Date: 2024      Visit Date: 2024    Visit Dx:    ICD-10-CM ICD-9-CM   1. Primary osteoarthritis of right knee  M17.11 715.16   2. Mechanical pain of right knee  M25.561 719.46       Patient Active Problem List   Diagnosis    Uterine adenomyoma    Osteoarthritis of patellofemoral joints of both knees    Mechanical knee pain, right    Tear of lateral meniscus of right knee    Primary osteoarthritis of right knee    Primary osteoarthritis of left knee    Spider veins    Greater trochanteric bursitis of left hip        Past Medical History:   Diagnosis Date    Ankle pain, left     Ankle sprain 1987    Arthritis of back 2018    Cervical disc disorder 2018    Dermatitis, unspecified 2022    Greater trochanteric bursitis of left hip 2024    Heavy menstrual bleeding     History of mammogram 2020    Knee swelling 2018    Localized edema 2022    Low back strain 2018    Migraine     Nontoxic goiter, unspecified 2022    Osteoarthritis of knee 2020    PONV (postoperative nausea and vomiting)     Sciatica of right side 2020    Spider veins     Spinal headache     Tear of meniscus of knee 2018    Tendonitis     left ankle    Varicose veins of bilateral lower extremities with pain     And inflammation    Wears contact lenses         Past Surgical History:   Procedure Laterality Date     SECTION      x2    COLONOSCOPY  2019    TOTAL LAPAROSCOPIC HYSTERECTOMY Bilateral 2018    Procedure: TOTAL LAPAROSCOPIC HYSTERECTOMY BILATERAL SALPINGECTOMY;  Surgeon: Nelia Olea MD;  [place] [facility] [place] [facility] [place]  Service: Gynecology    TUBAL ABDOMINAL LIGATION                          PT Assessment/Plan       Row Name 24 1000          PT Assessment    Assessment Comments Pt continues to respond favorably to her exercises and she tolerated  increased resistance well today.  -GC        PT Plan    PT Plan Comments Pt is to continue her HEP daily 1-2x daily.  -GC               User Key  (r) = Recorded By, (t) = Taken By, (c) = Cosigned By      Initials Name Provider Type    GC Chandan Larsen, PT Physical Therapist                     Modalities       Row Name 08/21/24 1000             Iontophoresis 21169    Milliamps 4  -GC      MA/Min 40  -GC      Dexamethasone used Yes  -GC      Patch Type Medium  right bakers cyst area  -GC      55751 - PT Iontophoresis Minutes 10  -GC         Functional Testing    Outcome Measure Options Lower Extremity Functional Scale (LEFS)  -GC                User Key  (r) = Recorded By, (t) = Taken By, (c) = Cosigned By      Initials Name Provider Type    GC Chandan Larsen, PT Physical Therapist                   OP Exercises       Row Name 08/21/24 1000             Subjective    Subjective Comments Pt states her knee is still swollen, but she does feel better.  -GC         Exercise 1    Exercise Name 1 Hamstring stretch  -GC      Reps 1 15  -GC      Time 1 10 secs  -GC         Exercise 2    Exercise Name 2 QS with Russian stim  -GC      Time 2 10 min 10/10  -GC         Exercise 3    Exercise Name 3 SAQ  -GC      Reps 3 50  -GC      Time 3 3#  -GC         Exercise 4    Exercise Name 4 LAQ/ball squeeze  -GC      Reps 4 50  -GC      Time 4 3#  -GC         Exercise 5    Exercise Name 5 TKE vs theraband  -GC      Reps 5 50  -GC      Time 5 silver  -GC         Exercise 6    Exercise Name 6 SLR  -GC      Reps 6 25  -GC      Time 6 1#  -GC         Exercise 7    Exercise Name 7 Hip ABD  -GC      Reps 7 25  -GC      Time 7 1#  -GC                User Key  (r) = Recorded By, (t) = Taken By, (c) = Cosigned By      Initials Name Provider Type    Chandan Strong, PT Physical Therapist                                          Outcome Measure Options: Lower Extremity Functional Scale (LEFS)         Time Calculation:   Start Time: 1000  Stop  Time: 1112  Time Calculation (min): 72 min  Timed Charges  29763 - PT Iontophoresis Minutes: 10  Total Minutes  Timed Charges Total Minutes: 10   Total Minutes: 10  Therapy Charges for Today       Code Description Service Date Service Provider Modifiers Qty    28810609614  PT THER PROC EA 15 MIN 8/21/2024 Chandan Larsen, PT GP 2            PT G-Codes  Outcome Measure Options: Lower Extremity Functional Scale (LEFS)         Chandan Larsen PT  8/21/2024

## 2024-08-28 ENCOUNTER — HOSPITAL ENCOUNTER (OUTPATIENT)
Dept: PHYSICAL THERAPY | Facility: HOSPITAL | Age: 54
Setting detail: THERAPIES SERIES
Discharge: HOME | End: 2024-08-28
Payer: COMMERCIAL

## 2024-08-28 DIAGNOSIS — M17.11 PRIMARY OSTEOARTHRITIS OF RIGHT KNEE: Primary | ICD-10-CM

## 2024-08-28 DIAGNOSIS — M25.561 MECHANICAL PAIN OF RIGHT KNEE: ICD-10-CM

## 2024-08-28 PROCEDURE — 97110 THERAPEUTIC EXERCISES: CPT | Performed by: PHYSICAL THERAPIST

## 2024-09-04 ENCOUNTER — APPOINTMENT (OUTPATIENT)
Dept: PHYSICAL THERAPY | Facility: HOSPITAL | Age: 54
End: 2024-09-04
Payer: COMMERCIAL

## 2024-09-11 ENCOUNTER — HOSPITAL ENCOUNTER (OUTPATIENT)
Dept: PHYSICAL THERAPY | Facility: HOSPITAL | Age: 54
Setting detail: THERAPIES SERIES
Discharge: HOME | End: 2024-09-11
Payer: COMMERCIAL

## 2024-09-11 DIAGNOSIS — M25.561 MECHANICAL PAIN OF RIGHT KNEE: ICD-10-CM

## 2024-09-11 DIAGNOSIS — M17.11 PRIMARY OSTEOARTHRITIS OF RIGHT KNEE: Primary | ICD-10-CM

## 2024-09-11 PROCEDURE — 97110 THERAPEUTIC EXERCISES: CPT | Performed by: SPECIALIST/TECHNOLOGIST

## 2024-09-11 NOTE — THERAPY TREATMENT NOTE
Outpatient Physical Therapy Ortho Treatment Note   Alexandra Chappell     Patient Name: Celsa Tom  : 1970  MRN: [id number]  Today's Date: 2024      Visit Date: 2024    Visit Dx:    ICD-10-CM ICD-9-CM   1. Primary osteoarthritis of right knee  M17.11 715.16   2. Mechanical pain of right knee  M25.561 719.46       Patient Active Problem List   Diagnosis    Uterine adenomyoma    Osteoarthritis of patellofemoral joints of both knees    Mechanical knee pain, right    Tear of lateral meniscus of right knee    Primary osteoarthritis of right knee    Primary osteoarthritis of left knee    Spider veins    Greater trochanteric bursitis of left hip        Past Medical History:   Diagnosis Date    Ankle pain, left     Ankle sprain 1987    Arthritis of back 2018    Cervical disc disorder 2018    Dermatitis, unspecified 2022    Greater trochanteric bursitis of left hip 2024    Heavy menstrual bleeding     History of mammogram 2020    Knee swelling 2018    Localized edema 2022    Low back strain 2018    Migraine     Nontoxic goiter, unspecified 2022    Osteoarthritis of knee 2020    PONV (postoperative nausea and vomiting)     Sciatica of right side 2020    Spider veins     Spinal headache     Tear of meniscus of knee 2018    Tendonitis     left ankle    Varicose veins of bilateral lower extremities with pain     And inflammation    Wears contact lenses         Past Surgical History:   Procedure Laterality Date     SECTION      x2    COLONOSCOPY  2019    TOTAL LAPAROSCOPIC HYSTERECTOMY Bilateral 2018    Procedure: TOTAL LAPAROSCOPIC HYSTERECTOMY BILATERAL SALPINGECTOMY;  Surgeon: Nelia Olea MD;  Location: [facility] OR [facility] [place];  Service: Gynecology    TUBAL ABDOMINAL LIGATION                          PT Assessment/Plan       Row Name 24 1000          PT Assessment    Assessment Comments Pt tolerated progression of ther ex well. Pain reported with lateral  "dips. Ionto placed over area of bakers cyst.  -KF        PT Plan    PT Plan Comments Continue per POC  -KF               User Key  (r) = Recorded By, (t) = Taken By, (c) = Cosigned By      Initials Name Provider Type    Karolina Delong PTA Physical Therapist Assistant                     Modalities       Row Name 09/11/24 1000             Iontophoresis 26081    Milliamps 4  -KF      MA/Min 40  -KF      Dexamethasone used Yes  -KF      Patch Type Medium  right bakers cyst area  -KF         Functional Testing    Outcome Measure Options Lower Extremity Functional Scale (LEFS)  -KF                User Key  (r) = Recorded By, (t) = Taken By, (c) = Cosigned By      Initials Name Provider Type    Karolina Delong PTA Physical Therapist Assistant                   OP Exercises       Row Name 09/11/24 1000             Subjective    Subjective Comments Pt states she continues to exprience swelling but does feel her knee is improving. States she completed additional standing and walking at the football game on Saturday and her knee did not hurt any worse. She does notice a difference not doing the ionto previous visit.  -KF         Exercise 1    Exercise Name 1 Hamstring stretch  -KF      Reps 1 15  -KF      Time 1 10 secs  -KF         Exercise 2    Exercise Name 2 QS with Russian stim  -KF      Time 2 10 min 10/10  -KF         Exercise 3    Exercise Name 3 SAQ  -KF      Reps 3 50  -KF      Time 3 3#  -KF         Exercise 4    Exercise Name 4 LAQ/ball squeeze  -KF      Reps 4 50  -KF      Time 4 3#  -KF         Exercise 5    Exercise Name 5 TKE vs theraband  -KF      Reps 5 50  -KF      Time 5 gold  -KF         Exercise 6    Exercise Name 6 SLR  -KF      Reps 6 25  -KF      Time 6 1#  -KF         Exercise 7    Exercise Name 7 Hip ABD  -KF      Reps 7 25  -KF      Time 7 1#  -KF         Exercise 8    Exercise Name 8 Mini Squats  -KF      Reps 8 25  -KF         Exercise 9    Exercise Name 9 2\" Lateral Dips  -KF      Reps 9 " --  -MAT                User Key  (r) = Recorded By, (t) = Taken By, (c) = Cosigned By      Initials Name Provider Type    Karolina Delong PTA Physical Therapist Assistant                                          Outcome Measure Options: Lower Extremity Functional Scale (LEFS)         Time Calculation:   Start Time: 1000  Stop Time: 1045  Time Calculation (min): 45 min  Therapy Charges for Today       Code Description Service Date Service Provider Modifiers Qty    67777133450 HC PT THER PROC EA 15 MIN 9/11/2024 Karolina Nath PTA GP 2            PT G-Codes  Outcome Measure Options: Lower Extremity Functional Scale (LEFS)         Karolina Nath PTA  9/11/2024

## 2024-09-16 ENCOUNTER — CLINICAL SUPPORT (OUTPATIENT)
Dept: ORTHOPEDIC SURGERY | Facility: CLINIC | Age: 54
End: 2024-09-16
Payer: COMMERCIAL

## 2024-09-16 DIAGNOSIS — M17.11 PRIMARY OSTEOARTHRITIS OF RIGHT KNEE: Primary | ICD-10-CM

## 2024-09-16 PROCEDURE — 20610 DRAIN/INJ JOINT/BURSA W/O US: CPT | Performed by: NURSE PRACTITIONER

## 2024-09-16 RX ORDER — CELECOXIB 200 MG/1
200 CAPSULE ORAL DAILY
Qty: 30 CAPSULE | Refills: 3 | Status: SHIPPED | OUTPATIENT
Start: 2024-09-16

## 2024-09-16 RX ORDER — CELECOXIB 200 MG/1
200 CAPSULE ORAL 2 TIMES DAILY
Qty: 60 CAPSULE | Refills: 0 | Status: SHIPPED | OUTPATIENT
Start: 2024-09-16

## 2024-09-18 ENCOUNTER — HOSPITAL ENCOUNTER (OUTPATIENT)
Dept: PHYSICAL THERAPY | Facility: HOSPITAL | Age: 54
Setting detail: THERAPIES SERIES
Discharge: HOME | End: 2024-09-18
Payer: COMMERCIAL

## 2024-09-18 DIAGNOSIS — M25.561 MECHANICAL PAIN OF RIGHT KNEE: ICD-10-CM

## 2024-09-18 DIAGNOSIS — M17.11 PRIMARY OSTEOARTHRITIS OF RIGHT KNEE: Primary | ICD-10-CM

## 2024-09-18 PROCEDURE — 97110 THERAPEUTIC EXERCISES: CPT | Performed by: SPECIALIST/TECHNOLOGIST

## 2024-09-25 ENCOUNTER — HOSPITAL ENCOUNTER (OUTPATIENT)
Dept: PHYSICAL THERAPY | Facility: HOSPITAL | Age: 54
Setting detail: THERAPIES SERIES
Discharge: HOME | End: 2024-09-25
Payer: COMMERCIAL

## 2024-09-25 DIAGNOSIS — M17.11 PRIMARY OSTEOARTHRITIS OF RIGHT KNEE: Primary | ICD-10-CM

## 2024-09-25 DIAGNOSIS — M25.561 MECHANICAL PAIN OF RIGHT KNEE: ICD-10-CM

## 2024-09-25 PROCEDURE — 97110 THERAPEUTIC EXERCISES: CPT | Performed by: SPECIALIST/TECHNOLOGIST

## 2024-10-09 ENCOUNTER — HOSPITAL ENCOUNTER (OUTPATIENT)
Dept: PHYSICAL THERAPY | Facility: HOSPITAL | Age: 54
Setting detail: THERAPIES SERIES
Discharge: HOME | End: 2024-10-09
Payer: COMMERCIAL

## 2024-10-09 DIAGNOSIS — M25.561 MECHANICAL PAIN OF RIGHT KNEE: ICD-10-CM

## 2024-10-09 DIAGNOSIS — M17.11 PRIMARY OSTEOARTHRITIS OF RIGHT KNEE: Primary | ICD-10-CM

## 2024-10-09 PROCEDURE — 97110 THERAPEUTIC EXERCISES: CPT | Performed by: SPECIALIST/TECHNOLOGIST

## 2024-10-09 NOTE — THERAPY TREATMENT NOTE
Outpatient Physical Therapy Ortho Treatment Note   Alexandra Chappell     Patient Name: Celsa Tom  : 1970  MRN: [id number]  Today's Date: 10/9/2024      Visit Date: 10/09/2024    Visit Dx:    ICD-10-CM ICD-9-CM   1. Primary osteoarthritis of right knee  M17.11 715.16   2. Mechanical pain of right knee  M25.561 719.46       Patient Active Problem List   Diagnosis    Uterine adenomyoma    Osteoarthritis of patellofemoral joints of both knees    Mechanical knee pain, right    Tear of lateral meniscus of right knee    Primary osteoarthritis of right knee    Primary osteoarthritis of left knee    Spider veins    Greater trochanteric bursitis of left hip        Past Medical History:   Diagnosis Date    Ankle pain, left     Ankle sprain 1987    Arthritis of back 2018    Cervical disc disorder 2018    Dermatitis, unspecified 2022    Greater trochanteric bursitis of left hip 2024    Heavy menstrual bleeding     History of mammogram 2020    Knee swelling 2018    Localized edema 2022    Low back strain 2018    Migraine     Nontoxic goiter, unspecified 2022    Osteoarthritis of knee 2020    PONV (postoperative nausea and vomiting)     Sciatica of right side 2020    Spider veins     Spinal headache     Tear of meniscus of knee 2018    Tendonitis     left ankle    Varicose veins of bilateral lower extremities with pain     And inflammation    Wears contact lenses         Past Surgical History:   Procedure Laterality Date     SECTION      x2    COLONOSCOPY  2019    TOTAL LAPAROSCOPIC HYSTERECTOMY Bilateral 2018    Procedure: TOTAL LAPAROSCOPIC HYSTERECTOMY BILATERAL SALPINGECTOMY;  Surgeon: Nelia Olea MD;  [place] [facility] [place] [facility] [place]  Service: Gynecology    TUBAL ABDOMINAL LIGATION                          PT Assessment/Plan       Row Name 10/09/24 1005          PT Assessment    Assessment Comments Pt presents with edema lateral/supra patella. Pt shows improved  "strength and function with prescribed exercises. Trial of KT for edeam applied. Advised pt to keep on 3-5 days and can reapply as needed.  -KF        PT Plan    PT Plan Comments Will f/u in two weeks. Pt to continue with HEP  -KF               User Key  (r) = Recorded By, (t) = Taken By, (c) = Cosigned By      Initials Name Provider Type    Karolina Delong PTA Physical Therapist Assistant                     Modalities       Row Name 10/09/24 1005             Other Treatment Provided    Taping / Bracing (R) knee KT tape- 2 fan strips for edema  -KF                User Key  (r) = Recorded By, (t) = Taken By, (c) = Cosigned By      Initials Name Provider Type    Karolina Delong PTA Physical Therapist Assistant                   OP Exercises       Row Name 10/09/24 1005             Subjective    Subjective Comments Pt states her knee continues to do really well, the only limitation she exerperiencs is swelling. States she notices in most with walking; she does notice improved quad strength  -KF         Exercise 1    Exercise Name 1 Hamstring stretch  -KF      Reps 1 15  -KF      Time 1 10 secs  -KF         Exercise 2    Exercise Name 2 QS with Russian stim  -KF      Time 2 10 min 10/10  -KF         Exercise 3    Exercise Name 3 SAQ  -KF      Reps 3 50  -KF      Time 3 5#  -KF         Exercise 4    Exercise Name 4 LAQ/ball squeeze  -KF      Reps 4 50  -KF      Time 4 5#  -KF         Exercise 5    Exercise Name 5 TKE vs theraband  -KF      Reps 5 50  -KF      Time 5 gold  -KF         Exercise 6    Exercise Name 6 SLR  -KF      Reps 6 25  -KF      Time 6 2#  -KF         Exercise 7    Exercise Name 7 Hip ABD  -KF      Reps 7 25  -KF      Time 7 2#  -KF         Exercise 8    Exercise Name 8 Mini Squats  -KF      Reps 8 25  -KF         Exercise 9    Exercise Name 9 4\" Lateral Dips  -KF      Reps 9 25  -KF                User Key  (r) = Recorded By, (t) = Taken By, (c) = Cosigned By      Initials Name Provider Type    " KF Karolina Nath PTA Physical Therapist Assistant                                                    Time Calculation:   Start Time: 1005  Stop Time: 1050  Time Calculation (min): 45 min  Therapy Charges for Today       Code Description Service Date Service Provider Modifiers Qty    53124198006 HC PT THER PROC EA 15 MIN 10/9/2024 Karolina Nath PTA GP 2                      Karolina Nath PTA  10/9/2024

## 2024-10-23 ENCOUNTER — HOSPITAL ENCOUNTER (OUTPATIENT)
Dept: PHYSICAL THERAPY | Facility: HOSPITAL | Age: 54
Setting detail: THERAPIES SERIES
Discharge: HOME | End: 2024-10-23
Payer: COMMERCIAL

## 2024-10-23 DIAGNOSIS — M25.561 MECHANICAL PAIN OF RIGHT KNEE: ICD-10-CM

## 2024-10-23 DIAGNOSIS — M17.11 PRIMARY OSTEOARTHRITIS OF RIGHT KNEE: Primary | ICD-10-CM

## 2024-10-23 NOTE — THERAPY TREATMENT NOTE
Outpatient Physical Therapy Ortho Treatment Note   Alexandra Chappell     Patient Name: Celsa Tom  : 1970  MRN: [id number]  Today's Date: 10/23/2024      Visit Date: 10/23/2024    Visit Dx:    ICD-10-CM ICD-9-CM   1. Primary osteoarthritis of right knee  M17.11 715.16   2. Mechanical pain of right knee  M25.561 719.46       Patient Active Problem List   Diagnosis    Uterine adenomyoma    Osteoarthritis of patellofemoral joints of both knees    Mechanical knee pain, right    Tear of lateral meniscus of right knee    Primary osteoarthritis of right knee    Primary osteoarthritis of left knee    Spider veins    Greater trochanteric bursitis of left hip        Past Medical History:   Diagnosis Date    Ankle pain, left     Ankle sprain 1987    Arthritis of back 2018    Cervical disc disorder 2018    Dermatitis, unspecified 2022    Greater trochanteric bursitis of left hip 2024    Heavy menstrual bleeding     History of mammogram 2020    Knee swelling 2018    Localized edema 2022    Low back strain 2018    Migraine     Nontoxic goiter, unspecified 2022    Osteoarthritis of knee 2020    PONV (postoperative nausea and vomiting)     Sciatica of right side 2020    Spider veins     Spinal headache     Tear of meniscus of knee 2018    Tendonitis     left ankle    Varicose veins of bilateral lower extremities with pain     And inflammation    Wears contact lenses         Past Surgical History:   Procedure Laterality Date     SECTION      x2    COLONOSCOPY  2019    TOTAL LAPAROSCOPIC HYSTERECTOMY Bilateral 2018    Procedure: TOTAL LAPAROSCOPIC HYSTERECTOMY BILATERAL SALPINGECTOMY;  Surgeon: Nelia Olea MD;  [place] [facility] [place] [facility] [place]: [place]necology    TUBAL ABDOMINAL LIGATION                          PT Assessment/Plan       Row Name 10/23/24 1010          PT Assessment    Assessment Comments Modalites completed for edema including ionto to posterior knee and  application of KT for edema.  -KF        PT Plan    PT Plan Comments Pt will scheduled on as needed basis. Pt to continue with HEP  -KF               User Key  (r) = Recorded By, (t) = Taken By, (c) = Cosigned By      Initials Name Provider Type    Karolina Delong PTA Physical Therapist Assistant                       OP Exercises       Row Name 10/23/24 1010             Subjective    Subjective Comments Pt states her knee is doing better; her primarily limitation is the swelling. States she has progressed activity in the gym with good tolerance. States she has experience increased swelling  and tenderness posterior knee after prolong car rides.  -KF                User Key  (r) = Recorded By, (t) = Taken By, (c) = Cosigned By      Initials Name Provider Type    Karolina Delong PTA Physical Therapist Assistant                                                    Time Calculation:   Start Time: 1010  Stop Time: 1035  Time Calculation (min): 25 min                Karolina Nath PTA  10/23/2024

## 2024-10-28 NOTE — THERAPY TREATMENT NOTE
Outpatient Physical Therapy Ortho Treatment Note   Alexandra Chappell     Patient Name: Celsa Tom  : 1970  MRN: [id number]  Today's Date: 2024      Visit Date: 2024    Visit Dx:    ICD-10-CM ICD-9-CM   1. Primary osteoarthritis of right knee  M17.11 715.16   2. Mechanical pain of right knee  M25.561 719.46       Patient Active Problem List   Diagnosis    Uterine adenomyoma    Osteoarthritis of patellofemoral joints of both knees    Mechanical knee pain, right    Tear of lateral meniscus of right knee    Primary osteoarthritis of right knee    Primary osteoarthritis of left knee    Spider veins    Greater trochanteric bursitis of left hip        Past Medical History:   Diagnosis Date    Ankle pain, left     Ankle sprain 1987    Arthritis of back 2018    Cervical disc disorder 2018    Dermatitis, unspecified 2022    Greater trochanteric bursitis of left hip 2024    Heavy menstrual bleeding     History of mammogram 2020    Knee swelling 2018    Localized edema 2022    Low back strain 2018    Migraine     Nontoxic goiter, unspecified 2022    Osteoarthritis of knee 2020    PONV (postoperative nausea and vomiting)     Sciatica of right side 2020    Spider veins     Spinal headache     Tear of meniscus of knee 2018    Tendonitis     left ankle    Varicose veins of bilateral lower extremities with pain     And inflammation    Wears contact lenses         Past Surgical History:   Procedure Laterality Date     SECTION      x2    COLONOSCOPY  2019    TOTAL LAPAROSCOPIC HYSTERECTOMY Bilateral 2018    Procedure: TOTAL LAPAROSCOPIC HYSTERECTOMY BILATERAL SALPINGECTOMY;  Surgeon: Nelia Olea MD;  Location: [facility] [place];  Service: Gynecology    TUBAL ABDOMINAL LIGATION          PT Ortho       Row Name 24 1000       Posture/Observations    Posture/Observations Comments Pt has mild to moderate edema suprapatella area. The edema in the popliteal area  posterior knee is very minimal  -GC       Knee Special Tests    Patellar grind test (chondromalacia patella) Right:;Positive  -GC              User Key  (r) = Recorded By, (t) = Taken By, (c) = Cosigned By      Initials Name Provider Type    Chandan Strong, PT Physical Therapist                                 PT Assessment/Plan       Row Name 08/28/24 1000          PT Assessment    Assessment Comments No ionto necessary due to decreased edema/symptoms in popliteal area. Pt still has PF symptoms.  -GC        PT Plan    PT Plan Comments Pt is to continue her HEP daily.  -GC               User Key  (r) = Recorded By, (t) = Taken By, (c) = Cosigned By      Initials Name Provider Type    Chandan Strong, PT Physical Therapist                       OP Exercises       Row Name 08/28/24 1000             Subjective    Subjective Comments Pt c/o increased knee swelling, but she says the back of her knee is feeling much better.  -GC         Exercise 1    Exercise Name 1 Hamstring stretch  -GC      Reps 1 15  -GC      Time 1 10 secs  -GC         Exercise 2    Exercise Name 2 QS with Russian stim  -GC      Time 2 10 min 10/10  -GC         Exercise 3    Exercise Name 3 SAQ  -GC      Reps 3 50  -GC      Time 3 2#  -GC         Exercise 4    Exercise Name 4 LAQ/ball squeeze  -GC      Reps 4 50  -GC      Time 4 2#  -GC         Exercise 5    Exercise Name 5 TKE vs theraband  -GC      Reps 5 50  -GC      Time 5 silver  -GC         Exercise 6    Exercise Name 6 SLR  -GC      Reps 6 25  -GC      Time 6 --  -GC         Exercise 7    Exercise Name 7 Hip ABD  -GC      Reps 7 25  -GC      Time 7 --  -GC                User Key  (r) = Recorded By, (t) = Taken By, (c) = Cosigned By      Initials Name Provider Type    Chandan Strong PT Physical Therapist                                                    Time Calculation:   Start Time: 1000  Stop Time: 1045  Time Calculation (min): 45 min  Therapy Charges for Today       Code  Description Service Date Service Provider Modifiers Qty    [id number]  PT THER PROC EA 15 MIN 8/28/2024 Chandan Larsen, PT GP 1                      Chandan Larsen, PT  8/28/2024      unknown

## 2024-11-13 NOTE — PROGRESS NOTES
"Chief Complaint  sclero consult    Subjective      HPI: Celsa Tom is a 54 y.o. female seen for initial evaluation of spider veins.  She has seen Dr. Martínez and our nurse practitioner Liza for spider veins, having had sclerotherapy.  She does not recall when.  She was under the assumption that this was a sclerotherapy treatment, but in fact the appointment today was a new patient evaluation.  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  She has undergone treatment with sclerotherapy and transcutaneous laser ablation.  Her legs do not hurt, and there is no swelling.  She generally does well with the treatments, and has not experienced any adverse reactions to the sclerosant.  In general her legs do not hurt or swell.  No history of venous thrombosis.    Objective   Vital Signs:  Ht 170.2 cm (67.01\")   Wt 86.2 kg (190 lb)   BMI 29.75 kg/m²   Estimated body mass index is 29.75 kg/m² as calculated from the following:    Height as of this encounter: 170.2 cm (67.01\").    Weight as of this encounter: 86.2 kg (190 lb).        Celsa Tom  reports that she has never smoked. She has never been exposed to tobacco smoke. She has never used smokeless tobacco..     Exam: BMI 29.8.  Palpable pedal artery pulses bilaterally.  Reticular vein anterior right calf.  No varicose veins.  Scattered spider veins in a limited distribution on the medial and lateral thighs and calves.  Makes mention of new vein in the medial left mid calf.  Some telangiectatic matting in the distal right calf.  Calves sized appropriately for body habitus, but no swelling or pitting edema.  No hyperpigmentation, lipodermatosclerosis, stasis dermatitis or healed or active ulcer.  Right and left ankle and calf circumferences 24 and 43 cm, respectively.    Assessment and Plan     Diagnoses and all orders for this visit:    1. Spider veins (Primary)    Summary: 54-year-old woman presents " as a new patient to me for evaluation of spider veins.  Previous history of right GSV and ASV reflux, post successful ablations 2022.  Has had sclerotherapy and transcutaneous laser treatment for spider veins previously.  On exam has limited spider veins, some matting in the distal right calf anteriorly and a reticular vein in the anterior right mid calf.  From an anatomic perspective she is a candidate for injection sclerotherapy with polidocanol.  Though she has had the procedure performed, I explained the nature of spider veins and their management with sclerotherapy.  I highlighted the differences between my approach and Dr. Willett approach.  She has compression stockings and so does not need new ones.  Will help to schedule procedure in the near future.  I put her in contact with our  prior to her departure.    Follow Up     No follow-ups on file.  Patient was given instructions and counseling regarding her condition or for health maintenance advice. Please see specific information pulled into the AVS if appropriate.

## 2024-11-14 ENCOUNTER — OFFICE VISIT (OUTPATIENT)
Age: 54
End: 2024-11-14
Payer: COMMERCIAL

## 2024-11-14 VITALS — WEIGHT: 190 LBS | BODY MASS INDEX: 29.82 KG/M2 | HEIGHT: 67 IN

## 2024-11-14 DIAGNOSIS — I78.1 SPIDER VEINS: Primary | ICD-10-CM

## 2024-11-18 RX ORDER — CELECOXIB 200 MG/1
200 CAPSULE ORAL DAILY
Qty: 30 CAPSULE | Refills: 3 | Status: SHIPPED | OUTPATIENT
Start: 2024-11-18

## 2024-12-05 ENCOUNTER — PROCEDURE VISIT (OUTPATIENT)
Age: 54
End: 2024-12-05
Payer: COMMERCIAL

## 2024-12-05 VITALS — DIASTOLIC BLOOD PRESSURE: 79 MMHG | HEART RATE: 91 BPM | SYSTOLIC BLOOD PRESSURE: 118 MMHG

## 2024-12-05 DIAGNOSIS — I78.1 SPIDER VEINS: Primary | ICD-10-CM

## 2024-12-05 DIAGNOSIS — M17.11 PRIMARY OSTEOARTHRITIS OF RIGHT KNEE: Primary | ICD-10-CM

## 2024-12-05 NOTE — PROGRESS NOTES
Procedures     Sclerotherapy    The patient indicated the veins to be treated.  The patient was positioned supine.  Polidocanol 0.8% liquid solution was prepared in a 5 mL syringe, diluted from 1% with normal saline.  The overlying skin was prepared with an alcohol swab.  The target veins were cannulated with a 30-gauge needle.  Low pressure, low volume injections were performed.  The sclerosant was massaged into distal ramifications.  Veins were treated in the anterior, posterior, medial and lateral calves in particular, with scattered veins in the medial and lateral thighs.  Total undiluted sclerosant volume was 6 mL.  The patient's compression stockings were applied.  The patient tolerated the procedure well.  There were no immediate complications.  Aftercare instructions were provided and reviewed.  All questions were answered.

## 2025-02-17 ENCOUNTER — TELEPHONE (OUTPATIENT)
Dept: ORTHOPEDIC SURGERY | Facility: CLINIC | Age: 55
End: 2025-02-17
Payer: COMMERCIAL

## 2025-02-17 NOTE — TELEPHONE ENCOUNTER
SPOKE WITH PATIENT REGARDING GEL INJECTION. PATIENT WILL HAVE OVER 900.00 OUT OF POCKET COST. OFFERED DISCOUNT PHARMACY. PATIENT WILL SIGN AND RETURN FORM TO OFFICE. PATIENT REQUESTING SINGLE INJECTION (GEL-ONE).

## 2025-03-07 ENCOUNTER — CLINICAL SUPPORT (OUTPATIENT)
Dept: ORTHOPEDIC SURGERY | Facility: CLINIC | Age: 55
End: 2025-03-07
Payer: COMMERCIAL

## 2025-03-07 VITALS — WEIGHT: 190 LBS | BODY MASS INDEX: 29.82 KG/M2 | HEIGHT: 67 IN

## 2025-03-07 DIAGNOSIS — M17.11 PRIMARY OSTEOARTHRITIS OF RIGHT KNEE: Primary | ICD-10-CM

## 2025-03-07 NOTE — PROGRESS NOTES
Large Joint Arthrocentesis: R knee  Date/Time: 3/7/2025 10:56 AM  Consent given by: patient  Site marked: site marked  Timeout: Immediately prior to procedure a time out was called to verify the correct patient, procedure, equipment, support staff and site/side marked as required   Supporting Documentation  Indications: pain   Procedure Details  Location: knee - R knee  Preparation: Patient was prepped and draped in the usual sterile fashion  Needle gauge: 21.  Approach: superior  Medications administered: 30 mg Cross-Linked Hyaluronate 30 MG/3ML  Patient tolerance: patient tolerated the procedure well with no immediate complications      Patient presents to clinic today for right knee viscosupplement injections.  This is the single injection of the series.  I explained details of injections as well as risks, benefits and alternatives with the patient today, had all questions answered, wished to proceed with injections.Patient was instructed to watch for signs or symptoms of infection including redness, swelling, warmth to the touch, or significant increased pain and to contact our office immediately if any of these issues were noted.

## 2025-03-19 NOTE — PROGRESS NOTES
Shoulder MRI Follow Up      Patient: Celsa Tom        YOB: 1970            Chief Complaints: Shoulder pain      History of Present Illness: The patient is here follow-up of an MRI of the shoulder      Physical Exam: 54 y.o. female  General Appearance:    Alert, cooperative, in no acute distress                 There were no vitals filed for this visit.     Patient is alert and read ×3 no acute distress appears her above-listed at height weight and age.  Affect is normal respiratory rate is normal unlabored. Heart rate regular rate rhythm, sclera, dentition and hearing are normal for the purpose of this exam.      Ortho Exam      MRI Results:  Procedures      Assessment/Plan:

## 2025-03-24 ENCOUNTER — OFFICE VISIT (OUTPATIENT)
Dept: ORTHOPEDIC SURGERY | Facility: CLINIC | Age: 55
End: 2025-03-24
Payer: COMMERCIAL

## 2025-03-24 VITALS — WEIGHT: 202.8 LBS | BODY MASS INDEX: 30.04 KG/M2 | TEMPERATURE: 97.7 F | HEIGHT: 69 IN

## 2025-03-24 DIAGNOSIS — R52 PAIN: Primary | ICD-10-CM

## 2025-03-24 DIAGNOSIS — S83.241A OTHER TEAR OF MEDIAL MENISCUS OF RIGHT KNEE AS CURRENT INJURY, INITIAL ENCOUNTER: ICD-10-CM

## 2025-03-24 PROCEDURE — 73562 X-RAY EXAM OF KNEE 3: CPT | Performed by: ORTHOPAEDIC SURGERY

## 2025-03-24 PROCEDURE — 99214 OFFICE O/P EST MOD 30 MIN: CPT | Performed by: ORTHOPAEDIC SURGERY

## 2025-03-24 NOTE — PROGRESS NOTES
New Knee      Patient: Celsa Tom        YOB: 1970    Medical Record Number: [id number]        Chief Complaints:   Bilateral knee pain right greater than left    History of Present Illness: This is a 54-year-old female presents complaining of right knee pain greater than left knee pain is been ongoing for 5 years not 1 history of injury change in activity she gets distinct swelling on the right occasional night pain her pain on the right is lateral she has been seeing Dr. Emanuel Alston's nurse practitioner who states there is nothing surgical.  She does have an MRI which shows some degenerative changes but also a lateral meniscus tear.  She just wanted another opinion she has tried weight lifting walking Pilates steroid injection most recently she had a gel injection 2 weeks ago past medical history is listed below and reviewed by me        Allergies: No Known Allergies    Medications:   Home Medications:  Current Outpatient Medications on File Prior to Visit   Medication Sig    BIOTIN PO Take  by mouth.    celecoxib (CeleBREX) 200 MG capsule Take 1 capsule by mouth Daily. May continue after completion of twice daily dosing    Cholecalciferol (VITAMIN D3 PO) Take  by mouth.    Cobalamin Combinations (B12 Folate) 800-800 MCG capsule Take  by mouth.    cyclobenzaprine (FLEXERIL) 10 MG tablet Take 1 tablet by mouth At Night As Needed for Muscle Spasms.    estradiol (MINIVELLE, VIVELLE-DOT) 0.05 MG/24HR patch APPLY 1 PATCH TWICE A WEEK    FOLIC ACID PO Take  by mouth.    Multiple Vitamin (MULTIVITAMIN) capsule Take 1 capsule by mouth Daily. Stopped preop, last dose 6/5/18    naproxen sodium (ALEVE) 220 MG tablet Take 1 tablet by mouth 2 (Two) Times a Day As Needed.    Omega-3 Fatty Acids (FISH OIL) 1000 MG capsule capsule Take  by mouth.    Probiotic Product (PROBIOTIC DAILY PO) Take 1 tablet by mouth Daily. Stopped preop, last dose 6/5/18    valACYclovir (VALTREX) 500 MG tablet Take 1 tablet by mouth  Daily.     No current facility-administered medications on file prior to visit.     Current Medications:  Scheduled Meds:  Continuous Infusions:No current facility-administered medications for this visit.    PRN Meds:.    Past Medical History:   Diagnosis Date    Ankle pain, left     Ankle sprain 1987    Arthritis of back 2018    Cervical disc disorder 2018    Dermatitis, unspecified 2022    Greater trochanteric bursitis of left hip 2024    Heavy menstrual bleeding     History of mammogram 2020    Knee swelling 2018    Localized edema 2022    Low back strain 2018    Migraine     Nontoxic goiter, unspecified 2022    Osteoarthritis of knee 2020    PONV (postoperative nausea and vomiting)     Sciatica of right side 2020    Spider veins     Spinal headache     Tear of meniscus of knee 2018    Tendonitis     left ankle    Varicose veins of bilateral lower extremities with pain     And inflammation    Wears contact lenses         Past Surgical History:   Procedure Laterality Date     SECTION      x2    COLONOSCOPY  2019    TOTAL LAPAROSCOPIC HYSTERECTOMY Bilateral 2018    Procedure: TOTAL LAPAROSCOPIC HYSTERECTOMY BILATERAL SALPINGECTOMY;  Surgeon: Nelia Olea MD;  Location: [facility] OR [facility] [place];  Service: Gynecology    TUBAL ABDOMINAL LIGATION          Social History     Occupational History    Not on file   Tobacco Use    Smoking status: Never     Passive exposure: Never    Smokeless tobacco: Never   Vaping Use    Vaping status: Never Used   Substance and Sexual Activity    Alcohol use: Yes     Alcohol/week: 2.0 standard drinks of alcohol     Types: 2 Glasses of wine per week    Drug use: No    Sexual activity: Yes     Partners: Male     Birth control/protection: Hysterectomy, Surgical, OCP      Social History     Social History Narrative    Not on file        Family History   Problem Relation Age of Onset    Cancer Mother     Anesthesia problems Father     Cancer  "Father     Cancer Maternal Grandmother     Osteoporosis Maternal Grandmother     Cancer Maternal Grandfather     Cancer Paternal Grandfather     Malig Hyperthermia Neg Hx              Review of Systems:     Review of Systems      Physical Exam: 54 y.o. female  General Appearance:    Alert, cooperative, in no acute distress                   Vitals:    03/24/25 0813   Temp: 97.7 °F (36.5 °C)   TempSrc: Temporal   Weight: 92 kg (202 lb 12.8 oz)   Height: 174 cm (68.5\")   PainSc: 6    PainLoc: Shoulder      Patient is alert and read ×3 no acute distress appears her above-listed at height weight and age.  Affect is normal respiratory rate is normal unlabored. Heart rate regular rate rhythm, sclera, dentition and hearing are normal for the purpose of this exam.        Ortho Exam  Physical exam of the right knee reveals no effusion no redness.  The patient does have tenderness about the lateral joint line.  No tenderness about the medial joint line.  A negative bounce home and a positive lateral Mackenzie.    Patient has a stable ligamentous exam.  The patient has a negative Lachman and negative anterior drawer and a negative pivot shift.  Quads are reasonable and symmetric bilaterally.  Calf is soft and nontender.  There is no overlying skin changes no lymphedema lymphadenopathy.  Patient has good hip range of motion full symmetric and asymptomatic and a normal ankle exam     Physical exam of the left knee reveals no effusion, no erythema.  The patient has no palpable tenderness along the medial joint line, no tenderness about the lateral joint line.  Patient does have crepitus with patellofemoral range of motion.  They also have subjective symptoms anteriorly during exam.  The patient has a negative bounce home, negative Mackenzie and a stable ligamentous exam.  Quad tone is reasonable and symmetric.  There are no overlying skin changes no lymphedema no lymphadenopathy.  There is good hip range of motion which is full " symmetric and asymptomatic and a normal ankle exam.  Hamstrings and IT band are tight bilaterally.   Procedures             Radiology:   AP, Lateral and merchant views of the right and left knee  were ordered/reviewed to evauateknee pain.  I have no comparative films she has moderate to severe patellofemoral OA she also has an MRI which shows a lateral meniscus tear in the body and the posterior horn as well as some degenerative changes laterally and patellofemoral I have reviewed and agree  Imaging Results (Most Recent)       Procedure Component Value Units Date/Time    XR Knee 3 View Bilateral [[id number]] Resulted: 03/24/25 0810     Updated: 03/24/25 0810    Impression:      Ordering physician's impression is located in the Encounter Note dated 03/24/25. X-ray performed in the DR room.            Assessment/Plan:    Left knee pain with some degenerative changes and a lateral meniscus tear she states she is done physical therapy it has been a while does not sound when she did a lot of core stuff plan is to proceed with aggressive physical therapy working on quad and core strengthening she needs to maintain an ideal body weight although it is not terrible I will see her back in 4 weeks we could consider arthroscopy depending on her response

## 2025-03-26 ENCOUNTER — HOSPITAL ENCOUNTER (OUTPATIENT)
Dept: PHYSICAL THERAPY | Facility: HOSPITAL | Age: 55
Setting detail: THERAPIES SERIES
Discharge: HOME | End: 2025-03-26
Payer: COMMERCIAL

## 2025-03-26 DIAGNOSIS — S83.241A OTHER TEAR OF MEDIAL MENISCUS OF RIGHT KNEE AS CURRENT INJURY, INITIAL ENCOUNTER: Primary | ICD-10-CM

## 2025-03-26 PROCEDURE — 97161 PT EVAL LOW COMPLEX 20 MIN: CPT | Performed by: PHYSICAL THERAPIST

## 2025-03-26 PROCEDURE — 97110 THERAPEUTIC EXERCISES: CPT | Performed by: PHYSICAL THERAPIST

## 2025-03-26 NOTE — THERAPY EVALUATION
Outpatient Physical Therapy Ortho Initial Evaluation   Annette     Patient Name: Celsa Tom  : 1970  MRN: [id number]  Today's Date: 3/26/2025      Visit Date: 2025    Patient Active Problem List   Diagnosis    Uterine adenomyoma    Osteoarthritis of patellofemoral joints of both knees    Mechanical knee pain, right    Tear of lateral meniscus of right knee    Primary osteoarthritis of right knee    Primary osteoarthritis of left knee    Spider veins    Greater trochanteric bursitis of left hip        Past Medical History:   Diagnosis Date    Ankle pain, left     Ankle sprain 1987    Arthritis of back 2018    Cervical disc disorder 2018    Dermatitis, unspecified 2022    Greater trochanteric bursitis of left hip 2024    Heavy menstrual bleeding     History of mammogram 2020    Knee swelling 2018    Localized edema 2022    Low back strain 2018    Migraine     Nontoxic goiter, unspecified 2022    Osteoarthritis of knee 2020    PONV (postoperative nausea and vomiting)     Sciatica of right side 2020    Spider veins     Spinal headache     Tear of meniscus of knee 2018    Tendonitis     left ankle    Varicose veins of bilateral lower extremities with pain     And inflammation    Wears contact lenses         Past Surgical History:   Procedure Laterality Date     SECTION      x2    COLONOSCOPY  2019    TOTAL LAPAROSCOPIC HYSTERECTOMY Bilateral 2018    Procedure: TOTAL LAPAROSCOPIC HYSTERECTOMY BILATERAL SALPINGECTOMY;  Surgeon: Nelia Olea MD;  Location: [facility];  Service: Gynecology    TUBAL ABDOMINAL LIGATION         Visit Dx:     ICD-10-CM ICD-9-CM   1. Other tear of medial meniscus of right knee as current injury, initial encounter  S83.241A 836.0          Patient History       Row Name 25 0830 25 0717          History    Chief Complaint Joint swelling;Swelling;Tightness  -GC Joint swelling;Swelling;Tightness  -GC (r)  patient (t)     Type of Pain Knee pain  -GC Knee pain  -GC (r) patient (t)     Date Current Problem(s) Began 02/24/25  -GC 02/24/25  -GC (r) patient (t)     Brief Description of Current Complaint Swelling on outside of knee  -GC Swelling on outside of knee  -GC (r) patient (t)     Patient/Caregiver Goals Improve mobility;Improve strength;Know what to do to help the symptoms  -GC Improve mobility;Improve strength;Know what to do to help the symptoms  -GC (r) patient (t)     Hand Dominance right-handed  -GC right-handed  -GC (r) patient (t)     Occupation/sports/leisure activities Pilates, [employer], Walking  -GC Pilates, [employer] Body Pump, Walking  -GC (r) patient (t)     Patient seeing anyone else for problem(s)? Yes  -GC Yes  -GC (r) patient (t)     What clinical tests have you had for this problem? X-ray;MRI  -GC X-ray;MRI  -GC (r) patient (t)     Are you or can you be pregnant No  -GC No  -GC (r) patient (t)        Pain     Pain Location Knee  bilateral, right > left  -GC --     Pain at Worst 6  -GC --     Pain Description Aching;Discomfort;Sore;Tender  -GC --     What Performance Factors Make the Current Problem(s) WORSE? Pt c/o pain wiht walking, stairs, squatting  -GC --     What Performance Factors Make the Current Problem(s) BETTER? pt feels best at rest  -GC --     Difficulties with ADL's? Pt has pain with walking, being on her feet, stairs  -GC --        Fall Risk Assessment    Any falls in the past year: No  -GC No  -GC (r) patient (t)        Services    Prior Rehab/Home Health Experiences Yes  -GC Yes  -GC (r) patient (t)     Are you currently receiving Home Health services No  -GC No  -GC (r) patient (t)     Do you plan to receive Home Health services in the near future No  -GC No  -GC (r) patient (t)        Daily Activities    Primary Language English  -GC English  -GC (r) patient (t)     Are you able to read Yes  -GC Yes  -GC (r) patient (t)     Are you able to write Yes  -GC Yes  -GC (r)  patient (t)     How does patient learn best? Reading;Demonstration  -GC Reading;Demonstration  -GC (r) patient (t)     Teaching needs identified Home Exercise Program;Management of Condition  -GC --     Patient is concerned about/has problems with Climbing Stairs;Performing home management (household chores, shopping, care of dependents);Performing job responsibilities/community activities (work, school,;Walking;Standing  -GC --     Does patient have problems with the following? None  -GC --     Barriers to learning None  -GC --     Functional Status bathing;dressing;grooming;mobility issues preventing performance of daily activities  -GC --     Pt Participated in POC and Goals Yes  -GC --        Safety    Are you being hurt, hit, or frightened by anyone at home or in your life? No  -GC No  -GC (r) patient (t)     Are you being neglected by a caregiver No  -GC No  -GC (r) patient (t)     Have you had any of the following issues with Anxiety  -GC Anxiety  -GC (r) patient (t)               User Key  (r) = Recorded By, (t) = Taken By, (c) = Cosigned By      Initials Name Provider Type    GC Chandan Larsen, PT Physical Therapist    patient Celsa Tom --                     PT Ortho       Row Name 03/26/25 0830       Posture/Observations    Posture/Observations Comments Pt has mild edema right knee  -GC       Knee Palpation    Patella Right:;Tender  -GC    Medial Joint Line Bilateral:;Tender  -GC    Lateral Joint Line Right:;Tender  -GC       Patellar Accessory Motions    Superior glide Right:;Left:;WNL  -GC    Inferior glide Right:;Left:;WNL  -GC    Medial glide Right:;Left:;WNL  -GC    Lateral glide Right:;Left:;WNL  -GC       Knee Special Tests    Anterior drawer (ACL lesion) Bilateral:;Negative  -GC    Posterior drawer (PCL lesion) Bilateral:;Negative  -GC    Valgus stress (MCL lesion) Bilateral:;Negative  -GC    Varus stress (LCL lesion) Bilateral:;Negative  -GC    Mackenzie’s test (meniscal lesion)  Right:;Positive;Left:;Negative  -GC    Bounce home test (meniscal lesion) Bilateral:;Negative  -GC    Patellar grind test (chondromalacia patella) Bilateral:;Positive  -GC       Right Lower Ext    Rt Knee Extension/Flexion AROM 0-2-130 degrees  -GC       Left Lower Ext    Lt Knee Extension/Flexion AROM 0-132 degrees  -GC       MMT Right Lower Ext    Rt Hip Flexion MMT, Gross Movement (4/5) good  -GC    Rt Hip Extension MMT, Gross Movement (4+/5) good plus  -GC    Rt Hip ABduction MMT, Gross Movement (5/5) normal  -GC    Rt Hip ADduction MMT, Gross Movement (4/5) good  -GC    Rt Knee Extension MMT, Gross Movement (4/5) good  -GC    Rt Knee Flexion MMT, Gross Movement (5/5) normal  -GC       MMT Left Lower Ext    Lt Hip Flexion MMT, Gross Movement (4+/5) good plus  -GC    Lt Hip Extension MMT, Gross Movement (5/5) normal  -GC    Lt Hip ABduction MMT, Gross Movement (5/5) normal  -GC    Lt Hip ADduction MMT, Gross Movement (4+/5) good plus  -GC    Lt Knee Extension MMT, Gross Movement (4+/5) good plus  -GC    Lt Knee Flexion MMT, Gross Movement (5/5) normal  -GC       Sensation    Light Touch No apparent deficits  -GC       Lower Extremity Flexibility    Hamstrings Bilateral:;Mildly limited  -GC    Hip Flexors Bilateral:;WNL  -GC    Quadriceps Bilateral:;WNL  -GC    ITB Bilateral:;WNL  -GC       Gait/Stairs (Locomotion)    Comment, (Gait/Stairs) Pt ambulates normally on level surfaces  -              User Key  (r) = Recorded By, (t) = Taken By, (c) = Cosigned By      Initials Name Provider Type    GC Chandan Larsen, PT Physical Therapist                                Therapy Education  Given: HEP, Symptoms/condition management, Pain management  Program: New  How Provided: Verbal, Demonstration, Written  Provided to: Patient  Level of Understanding: Teach back education performed, Verbalized, Demonstrated      PT OP Goals       Row Name 03/26/25 08          PT Short Term Goals    STG Date to Achieve 04/09/25  -      STG 1 Decrease knee pain to 2-3/10 with activity.  -     STG 2 Increase righ tknee extension to 0 degrees with testing.  -     STG 3 Increase hamstring flexibility to WFL with testing.  -     STG 4 Increase hip and quad strength to at least 4+/5 all planes with testing.  -     STG 5 Pt will be independent with her HEP issued by this therapist.  -        Long Term Goals    LTG Date to Achieve 04/23/25  -     LTG 1 Decrease knee pain to 0-1/10 with activity.  -     LTG 2 Increase hip and quad strength to 5/5 all planes with testing.  -     LTG 3 Pt will be independent with all ADLs without pain.  -        Time Calculation    PT Goal Re-Cert Due Date 04/23/25  -               User Key  (r) = Recorded By, (t) = Taken By, (c) = Cosigned By      Initials Name Provider Type     Chadnan Larsen, PT Physical Therapist                     PT Assessment/Plan       Row Name 03/26/25 0830          PT Assessment    Functional Limitations Limitations in community activities;Limitations in functional capacity and performance;Performance in leisure activities  -     Impairments Muscle strength;Pain;Impaired flexibility  -     Assessment Comments Pt presents with a several year history of bilateral knee pain, right > left. She has had previous PT and has had previous injections that helped some. She currently rates her kne pain up to 6/10 with activity. She ahs decreased hamstring flexibility, decreased core and LE strength, and decreased function secondary to the above.  -     Rehab Potential Good  -     Patient/caregiver participated in establishment of treatment plan and goals Yes  -     Patient would benefit from skilled therapy intervention Yes  -        PT Plan    PT Frequency 1x/week;2x/week  -     Predicted Duration of Therapy Intervention (PT) 4 weeks  -     Planned CPT's? PT EVAL LOW COMPLEXITY: 24612;PT THER PROC EA 15 MIN: 42945;PT HOT OR COLD PACK TREAT MCARE;PT ELECTRICAL STIM  UNATTEND:   -GC     PT Plan Comments Pt is to continue her HEP 2x daily  -GC               User Key  (r) = Recorded By, (t) = Taken By, (c) = Cosigned By      Initials Name Provider Type    GC Chandan Larsen, PT Physical Therapist                       OP Exercises       Row Name 03/26/25 0830             Exercise 1    Exercise Name 1 Hamstring stretch-bilateral  -GC         Exercise 2    Exercise Name 2 QS with Taiwanese Stim-bilateral  -GC      Time 2 10 min 10/10  -GC         Exercise 3    Exercise Name 3 SLR-bilateral  -GC      Reps 3 25  -GC         Exercise 4    Exercise Name 4 Supine clam shell vs theraband  -GC      Reps 4 25  -GC      Time 4 silver  -GC         Exercise 5    Exercise Name 5 bridge vs theraband  -GC      Reps 5 25  -GC      Time 5 silver  -GC         Exercise 6    Exercise Name 6 bridge with ball squeeze  -GC      Reps 6 25  -GC         Exercise 7    Exercise Name 7 SAQ/ball squeeze  -GC      Reps 7 50  -GC         Exercise 8    Exercise Name 8 LAQ/ball squeeze  -GC      Reps 8 50  -GC         Exercise 9    Exercise Name 9 TKE vs theraband  -GC      Reps 9 50  -GC      Time 9 silver  -GC                User Key  (r) = Recorded By, (t) = Taken By, (c) = Cosigned By      Initials Name Provider Type    Chandan Strong, PT Physical Therapist                                  Outcome Measure Options: Lower Extremity Functional Scale (LEFS)  Lower Extremity Functional Index  Any of your usual work, housework or school activities: Moderate difficulty  Your usual hobbies, recreational or sporting activities: Moderate difficulty  Getting into or out of the bath: Moderate difficulty  Walking between rooms: No difficulty  Putting on your shoes or socks: A little bit of difficulty  Squatting: Moderate difficulty  Lifting an object, like a bag of groceries from the floor: No difficulty  Performing light activities around your home: No difficulty  Performing heavy activities around your home: Moderate  difficulty  Getting into or out of a car: A little bit of difficulty  Walking 2 blocks: No difficulty  Walking a mile: No difficulty  Going up or down 10 stairs (about 1 flight of stairs): Moderate difficulty  Standing for 1 hour: A little bit of difficulty  Sitting for 1 hour: No difficulty  Running on even ground: Quite a bit of difficulty  Running on uneven ground: Quite a bit of difficulty  Making sharp turns while running fast: Quite a bit of difficulty  Hopping: Quite a bit of difficulty  Rolling over in bed: A little bit of difficulty  Total: 52  Lower Extremity Functional Index  Any of your usual work, housework or school activities: Moderate difficulty  Your usual hobbies, recreational or sporting activities: Moderate difficulty  Getting into or out of the bath: Moderate difficulty  Walking between rooms: No difficulty  Putting on your shoes or socks: A little bit of difficulty  Squatting: Moderate difficulty  Lifting an object, like a bag of groceries from the floor: No difficulty  Performing light activities around your home: No difficulty  Performing heavy activities around your home: Moderate difficulty  Getting into or out of a car: A little bit of difficulty  Walking 2 blocks: No difficulty  Walking a mile: No difficulty  Going up or down 10 stairs (about 1 flight of stairs): Moderate difficulty  Standing for 1 hour: A little bit of difficulty  Sitting for 1 hour: No difficulty  Running on even ground: Quite a bit of difficulty  Running on uneven ground: Quite a bit of difficulty  Making sharp turns while running fast: Quite a bit of difficulty  Hopping: Quite a bit of difficulty  Rolling over in bed: A little bit of difficulty  Total: 52      Time Calculation:     Start Time: 0830  Stop Time: 0925  Time Calculation (min): 55 min     Therapy Charges for Today       Code Description Service Date Service Provider Modifiers Qty    [id number]  PT EVAL LOW COMPLEXITY 2 3/26/2025 Chandan Larsen, PT GP 1     [id number]  PT THER PROC EA 15 MIN 3/26/2025 Chandan Larsen, PT GP 1            PT G-Codes  Outcome Measure Options: Lower Extremity Functional Scale (LEFS)  Total: 52         Chandan Larsen, PT  3/26/2025

## 2025-03-28 ENCOUNTER — PATIENT ROUNDING (BHMG ONLY) (OUTPATIENT)
Dept: ORTHOPEDIC SURGERY | Facility: CLINIC | Age: 55
End: 2025-03-28
Payer: COMMERCIAL

## 2025-03-29 NOTE — PROGRESS NOTES
March 28, 2025      A gDecide Message has been sent to the patient for PATIENT ROUNDING with [facility]

## 2025-04-01 NOTE — PROGRESS NOTES
Patient: Celsa Tom  YOB: 1970  Date of Service: 4/1/2025    Chief Complaints: Bilateral knee pain right greater than left    Subjective:    History of Present Illness: Pt is seen in the office today with complaints of bilateral knee pain right greater than left we have treated this conservatively and she states she is about 50% better she is working on quad and core strengthening she did had a have an MRI December 2023 that showed tricompartmental arthritis she states her symptoms feel about the same      Allergies: No Known Allergies    Medications:   Home Medications:  Current Outpatient Medications on File Prior to Visit   Medication Sig    BIOTIN PO Take  by mouth.    celecoxib (CeleBREX) 200 MG capsule Take 1 capsule by mouth Daily. May continue after completion of twice daily dosing    Cholecalciferol (VITAMIN D3 PO) Take  by mouth.    Cobalamin Combinations (B12 Folate) 800-800 MCG capsule Take  by mouth.    cyclobenzaprine (FLEXERIL) 10 MG tablet Take 1 tablet by mouth At Night As Needed for Muscle Spasms.    estradiol (MINIVELLE, VIVELLE-DOT) 0.05 MG/24HR patch APPLY 1 PATCH TWICE A WEEK    FOLIC ACID PO Take  by mouth.    Multiple Vitamin (MULTIVITAMIN) capsule Take 1 capsule by mouth Daily. Stopped preop, last dose 6/5/18    naproxen sodium (ALEVE) 220 MG tablet Take 1 tablet by mouth 2 (Two) Times a Day As Needed.    Omega-3 Fatty Acids (FISH OIL) 1000 MG capsule capsule Take  by mouth.    Probiotic Product (PROBIOTIC DAILY PO) Take 1 tablet by mouth Daily. Stopped preop, last dose 6/5/18    valACYclovir (VALTREX) 500 MG tablet Take 1 tablet by mouth Daily.     No current facility-administered medications on file prior to visit.     Current Medications:  Scheduled Meds:  Continuous Infusions:No current facility-administered medications for this visit.    PRN Meds:.    I have reviewed the patient's medical history in detail and updated the computerized patient record.  Review and  summarization of old records include:    Past Medical History:   Diagnosis Date    Ankle pain, left     Ankle sprain 1987    Arthritis of back 2018    Cervical disc disorder 2018    Dermatitis, unspecified 2022    Greater trochanteric bursitis of left hip 2024    Heavy menstrual bleeding     History of mammogram 2020    Knee swelling 2018    Localized edema 2022    Low back strain 2018    Migraine     Nontoxic goiter, unspecified 2022    Osteoarthritis of knee 2020    PONV (postoperative nausea and vomiting)     Sciatica of right side 2020    Spider veins     Spinal headache     Tear of meniscus of knee 2018    Tendonitis     left ankle    Varicose veins of bilateral lower extremities with pain     And inflammation    Wears contact lenses         Past Surgical History:   Procedure Laterality Date     SECTION      x2    COLONOSCOPY  2019    TOTAL LAPAROSCOPIC HYSTERECTOMY Bilateral 2018    Procedure: TOTAL LAPAROSCOPIC HYSTERECTOMY BILATERAL SALPINGECTOMY;  Surgeon: Nelia Olea MD;  Location: [facility];  Service: Gynecology    TUBAL ABDOMINAL LIGATION          Social History     Occupational History    Not on file   Tobacco Use    Smoking status: Never     Passive exposure: Never    Smokeless tobacco: Never   Vaping Use    Vaping status: Never Used   Substance and Sexual Activity    Alcohol use: Yes     Alcohol/week: 2.0 standard drinks of alcohol     Types: 2 Glasses of wine per week    Drug use: No    Sexual activity: Yes     Partners: Male     Birth control/protection: Hysterectomy, Surgical, OCP      Social History     Social History Narrative    Not on file        Family History   Problem Relation Age of Onset    Cancer Mother     Anesthesia problems Father     Cancer Father     Cancer Maternal Grandmother     Osteoporosis Maternal Grandmother     Cancer Maternal Grandfather     Cancer Paternal Grandfather     Malig Hyperthermia Neg Hx        ROS:  14 point review of systems was performed and was negative except for documented findings in HPI and today's encounter.     Allergies: No Known Allergies  Constitutional:  Denies fever, shaking or chills   Eyes:  Denies change in visual acuity   HENT:  Denies nasal congestion or sore throat   Respiratory:  Denies cough or shortness of breath   Cardiovascular:  Denies chest pain or severe LE edema   GI:  Denies abdominal pain, nausea, vomiting, bloody stools or diarrhea   Musculoskeletal:  Numbness, tingling, or loss of motor function only as noted above in history of present illness.  : Denies painful urination or hematuria  Integument:  Denies rash, lesion or ulceration   Neurologic:  Denies headache or focal weakness  Endocrine:  Denies lymphadenopathy  Psych:  Denies confusion or change in mental status   Hem:  Denies active bleeding      Physical Exam: 54 y.o. female  Wt Readings from Last 3 Encounters:   03/24/25 92 kg (202 lb 12.8 oz)   03/07/25 86.2 kg (190 lb)   11/14/24 86.2 kg (190 lb)       There is no height or weight on file to calculate BMI.    There were no vitals filed for this visit.  Vital signs reviewed.   General Appearance:    Alert, cooperative, in no acute distress                    Ortho exam  Physical exam of the right and left knee reveals no effusion, no erythema.  The patient has no palpable tenderness along the medial joint line, no tenderness about the lateral joint line.  Patient does have crepitus with patellofemoral range of motion.  They also have subjective symptoms anteriorly during exam.  The patient has a negative bounce home, negative Mackenzie and a stable ligamentous exam.  Quad tone is reasonable and symmetric.  There are no overlying skin changes no lymphedema no lymphadenopathy.  There is good hip range of motion which is full symmetric and asymptomatic and a normal ankle exam.  Hamstrings and IT band are tight bilaterally.                  Assessment: Bilateral knee pain  I do think this is arthritis we could repeat her MRI but she states it feels just like it did back when she had the previous MRI she is better she understands her options going forward of recurrent injections she has had gel injections in the past at her primary care she has paid out-of-pocket she states it does give her some relief she just had 1 in March so not a candidate at this time she understands her options going forward    Plan: As above  Follow up as indicated.  Ice, elevate, and rest as needed.  Discussed conservative measures of pain control including ice, bracing.  Also talked about the importance of strengthening and maintaining ideal body weight    Grace Alaniz M.D.

## 2025-04-16 ENCOUNTER — HOSPITAL ENCOUNTER (OUTPATIENT)
Dept: PHYSICAL THERAPY | Facility: HOSPITAL | Age: 55
Setting detail: THERAPIES SERIES
Discharge: HOME | End: 2025-04-16
Payer: COMMERCIAL

## 2025-04-16 DIAGNOSIS — S83.241A OTHER TEAR OF MEDIAL MENISCUS OF RIGHT KNEE AS CURRENT INJURY, INITIAL ENCOUNTER: Primary | ICD-10-CM

## 2025-04-16 PROCEDURE — 97110 THERAPEUTIC EXERCISES: CPT | Performed by: PHYSICAL THERAPIST

## 2025-04-16 NOTE — THERAPY TREATMENT NOTE
Outpatient Physical Therapy Ortho Treatment Note   Annette     Patient Name: Celsa Tom  : 1970  MRN: [id number]  Today's Date: 2025      Visit Date: 2025    Visit Dx:    ICD-10-CM ICD-9-CM   1. Other tear of medial meniscus of right knee as current injury, initial encounter  S83.241A 836.0       Patient Active Problem List   Diagnosis    Uterine adenomyoma    Osteoarthritis of patellofemoral joints of both knees    Mechanical knee pain, right    Tear of lateral meniscus of right knee    Primary osteoarthritis of right knee    Primary osteoarthritis of left knee    Spider veins    Greater trochanteric bursitis of left hip        Past Medical History:   Diagnosis Date    Ankle pain, left     Ankle sprain 1987    Arthritis of back 2018    Cervical disc disorder 2018    Dermatitis, unspecified 2022    Greater trochanteric bursitis of left hip 2024    Heavy menstrual bleeding     History of mammogram 2020    Knee swelling 2018    Localized edema 2022    Low back strain 2018    Migraine     Nontoxic goiter, unspecified 2022    Osteoarthritis of knee 2020    PONV (postoperative nausea and vomiting)     Sciatica of right side 2020    Spider veins     Spinal headache     Tear of meniscus of knee 2018    Tendonitis     left ankle    Varicose veins of bilateral lower extremities with pain     And inflammation    Wears contact lenses         Past Surgical History:   Procedure Laterality Date     SECTION      x2    COLONOSCOPY  2019    TOTAL LAPAROSCOPIC HYSTERECTOMY Bilateral 2018    Procedure: TOTAL LAPAROSCOPIC HYSTERECTOMY BILATERAL SALPINGECTOMY;  Surgeon: Nelia Olea MD;  Location: [facility] OR [facility] [place];  Service: Gynecology    TUBAL ABDOMINAL LIGATION                          PT Assessment/Plan       Row Name 25 0830          PT Assessment    Assessment Comments Pt tolerated her exercise progression well.  -GC        PT Plan    PT Plan  Comments Pt is to continue her HEP daily. She has MD follow up 4/28. Will re-ck after that.  -GC               User Key  (r) = Recorded By, (t) = Taken By, (c) = Cosigned By      Initials Name Provider Type    GC Chandan Larsen, PT Physical Therapist                       OP Exercises       Row Name 04/16/25 0830             Exercise 1    Exercise Name 1 Hamstring stretch-bilateral  -GC      Reps 1 15  -GC      Time 1 10 secs  -GC         Exercise 2    Exercise Name 2 QS with [place] Stim-bilateral  -GC      Time 2 10 min 10/10  -GC         Exercise 3    Exercise Name 3 SLR-bilateral  -GC      Reps 3 25  -GC      Time 3 2#  -GC         Exercise 4    Exercise Name 4 Supine clam shell vs theraband  -GC      Reps 4 25  -GC      Time 4 gold  -GC         Exercise 5    Exercise Name 5 bridge vs theraband  -GC      Reps 5 25  -GC      Time 5 gold  -GC         Exercise 6    Exercise Name 6 bridge with ball squeeze  -GC      Reps 6 25  -GC         Exercise 7    Exercise Name 7 SAQ/ball squeeze  -GC      Reps 7 50  -GC      Time 7 2#  -GC         Exercise 8    Exercise Name 8 LAQ/ball squeeze  -GC      Reps 8 50  -GC      Time 8 2#  -GC         Exercise 9    Exercise Name 9 TKE vs theraband  -GC      Reps 9 50  -GC      Time 9 gold  -GC                User Key  (r) = Recorded By, (t) = Taken By, (c) = Cosigned By      Initials Name Provider Type     Chandan Larsen, PT Physical Therapist                                                    Time Calculation:   Start Time: 0830  Stop Time: 0923  Time Calculation (min): 53 min  Therapy Charges for Today       Code Description Service Date Service Provider Modifiers Qty    47970217607 HC PT THER PROC EA 15 MIN 4/16/2025 Chandan Larsen, PT GP 2                      Chandan Larsen PT  4/16/2025

## 2025-04-28 ENCOUNTER — OFFICE VISIT (OUTPATIENT)
Dept: ORTHOPEDIC SURGERY | Facility: CLINIC | Age: 55
End: 2025-04-28
Payer: COMMERCIAL

## 2025-04-28 VITALS — BODY MASS INDEX: 29.92 KG/M2 | HEIGHT: 69 IN | TEMPERATURE: 97.5 F | WEIGHT: 202 LBS

## 2025-04-28 DIAGNOSIS — M17.10 PRIMARY LOCALIZED OSTEOARTHROSIS OF LOWER LEG, UNSPECIFIED LATERALITY: Primary | ICD-10-CM

## 2025-04-28 PROCEDURE — 99213 OFFICE O/P EST LOW 20 MIN: CPT | Performed by: ORTHOPAEDIC SURGERY

## 2025-04-30 ENCOUNTER — HOSPITAL ENCOUNTER (OUTPATIENT)
Dept: PHYSICAL THERAPY | Facility: HOSPITAL | Age: 55
Setting detail: THERAPIES SERIES
Discharge: HOME | End: 2025-04-30
Payer: COMMERCIAL

## 2025-04-30 DIAGNOSIS — S83.241A OTHER TEAR OF MEDIAL MENISCUS OF RIGHT KNEE AS CURRENT INJURY, INITIAL ENCOUNTER: Primary | ICD-10-CM

## 2025-04-30 PROCEDURE — 97110 THERAPEUTIC EXERCISES: CPT | Performed by: PHYSICAL THERAPIST

## 2025-04-30 NOTE — THERAPY TREATMENT NOTE
Outpatient Physical Therapy Ortho Treatment Note   Annette     Patient Name: Celsa Tom  : 1970  MRN: [id number]  Today's Date: 2025      Visit Date: 2025    Visit Dx:    ICD-10-CM ICD-9-CM   1. Other tear of medial meniscus of right knee as current injury, initial encounter  S83.241A 836.0       Patient Active Problem List   Diagnosis    Uterine adenomyoma    Osteoarthritis of patellofemoral joints of both knees    Mechanical knee pain, right    Tear of lateral meniscus of right knee    Primary osteoarthritis of right knee    Primary osteoarthritis of left knee    Spider veins    Greater trochanteric bursitis of left hip        Past Medical History:   Diagnosis Date    Ankle pain, left     Ankle sprain 1987    Arthritis of back 2018    Cervical disc disorder 2018    Dermatitis, unspecified 2022    Greater trochanteric bursitis of left hip 2024    Heavy menstrual bleeding     History of mammogram 2020    Knee swelling 2018    Localized edema 2022    Low back strain 2018    Migraine     Nontoxic goiter, unspecified 2022    Osteoarthritis of knee 2020    PONV (postoperative nausea and vomiting)     Sciatica of right side 2020    Spider veins     Spinal headache     Tear of meniscus of knee 2018    Tendonitis     left ankle    Varicose veins of bilateral lower extremities with pain     And inflammation    Wears contact lenses         Past Surgical History:   Procedure Laterality Date     SECTION      x2    COLONOSCOPY  2019    TOTAL LAPAROSCOPIC HYSTERECTOMY Bilateral 2018    Procedure: TOTAL LAPAROSCOPIC HYSTERECTOMY BILATERAL SALPINGECTOMY;  Surgeon: Nelia Olea MD;  Location: [facility] OR [facility] [place];  Service: Gynecology    TUBAL ABDOMINAL LIGATION                          PT Assessment/Plan       Row Name 25 0835          PT Assessment    Assessment Comments Pt is doing well wtih good tolerance to her exercise progression.  -GC         PT Plan    PT Plan Comments Pt is to continue her HEP daily. Will re-ck again in 2 weeks.  -GC               User Key  (r) = Recorded By, (t) = Taken By, (c) = Cosigned By      Initials Name Provider Type     Chandan Larsen, PT Physical Therapist                       OP Exercises       Row Name 04/30/25 0835             Subjective    Subjective Comments Pt states she is okay. She says the doctor does not think she is strong enough.  -GC         Exercise 1    Exercise Name 1 Hamstring stretch-bilateral  -GC      Reps 1 15  -GC      Time 1 10 secs  -GC         Exercise 2    Exercise Name 2 QS with [place] Stim-bilateral  -GC      Time 2 10 min 10/10  -GC         Exercise 3    Exercise Name 3 SLR-bilateral  -GC      Reps 3 25  -GC      Time 3 3#  -GC         Exercise 4    Exercise Name 4 Supine clam shell vs theraband  -GC      Reps 4 25  -GC      Time 4 gold  -GC         Exercise 5    Exercise Name 5 bridge vs theraband  -GC      Reps 5 25  -GC      Time 5 gold  -GC         Exercise 6    Exercise Name 6 bridge with ball squeeze  -GC      Reps 6 25  -GC         Exercise 7    Exercise Name 7 SAQ/ball squeeze  -GC      Reps 7 50  -GC      Time 7 4#  -GC         Exercise 8    Exercise Name 8 LAQ/ball squeeze  -GC      Reps 8 50  -GC      Time 8 4#  -GC         Exercise 9    Exercise Name 9 TKE vs theraband  -GC      Reps 9 50  -GC      Time 9 gold  -GC                User Key  (r) = Recorded By, (t) = Taken By, (c) = Cosigned By      Initials Name Provider Type     Chandan Larsen, PT Physical Therapist                                                    Time Calculation:   Start Time: 0835  Stop Time: 0921  Time Calculation (min): 46 min  Therapy Charges for Today       Code Description Service Date Service Provider Modifiers Qty    19642850510 HC PT THER PROC EA 15 MIN 4/30/2025 Chandan Larsen, PT GP 2                      Chandan Larsen PT  4/30/2025

## 2025-05-07 NOTE — PROGRESS NOTES
Patient: Celsa Tom  YOB: 1970  Date of Service: 5/7/2025    Chief Complaints: Right knee pain    Subjective:    History of Present Illness: Pt is seen in the office today with complaints ofright knee pain she does have some degenerative changes  she is here today for PRP injection she understands insurance does not cover the cost is 400        Allergies: No Known Allergies    Medications:   Home Medications:  Current Outpatient Medications on File Prior to Visit   Medication Sig    BIOTIN PO Take  by mouth.    celecoxib (CeleBREX) 200 MG capsule Take 1 capsule by mouth Daily. May continue after completion of twice daily dosing (Patient not taking: Reported on 4/28/2025)    Cholecalciferol (VITAMIN D3 PO) Take  by mouth.    Cobalamin Combinations (B12 Folate) 800-800 MCG capsule Take  by mouth.    cyclobenzaprine (FLEXERIL) 10 MG tablet Take 1 tablet by mouth At Night As Needed for Muscle Spasms. (Patient not taking: Reported on 4/28/2025)    estradiol (MINIVELLE, VIVELLE-DOT) 0.05 MG/24HR patch APPLY 1 PATCH TWICE A WEEK    FOLIC ACID PO Take  by mouth.    Multiple Vitamin (MULTIVITAMIN) capsule Take 1 capsule by mouth Daily. Stopped preop, last dose 6/5/18    naproxen sodium (ALEVE) 220 MG tablet Take 1 tablet by mouth 2 (Two) Times a Day As Needed.    Omega-3 Fatty Acids (FISH OIL) 1000 MG capsule capsule Take  by mouth.    Probiotic Product (PROBIOTIC DAILY PO) Take 1 tablet by mouth Daily. Stopped preop, last dose 6/5/18    valACYclovir (VALTREX) 500 MG tablet Take 1 tablet by mouth Daily.     No current facility-administered medications on file prior to visit.     Current Medications:  Scheduled Meds:  Continuous Infusions:No current facility-administered medications for this visit.    PRN Meds:.    I have reviewed the patient's medical history in detail and updated the computerized patient record.  Review and summarization of old records include:    Past Medical History:   Diagnosis Date     Ankle pain, left     Ankle sprain 1987    Arthritis of back 2018    Cervical disc disorder 2018    Dermatitis, unspecified 2022    Greater trochanteric bursitis of left hip 2024    Heavy menstrual bleeding     History of mammogram 2020    Knee swelling 2018    Localized edema 2022    Low back strain 2018    Migraine     Nontoxic goiter, unspecified 2022    Osteoarthritis of knee 2020    PONV (postoperative nausea and vomiting)     Sciatica of right side 2020    Spider veins     Spinal headache     Tear of meniscus of knee 2018    Tendonitis     left ankle    Varicose veins of bilateral lower extremities with pain     And inflammation    Wears contact lenses         Past Surgical History:   Procedure Laterality Date     SECTION      x2    COLONOSCOPY  2019    TOTAL LAPAROSCOPIC HYSTERECTOMY Bilateral 2018    Procedure: TOTAL LAPAROSCOPIC HYSTERECTOMY BILATERAL SALPINGECTOMY;  Surgeon: eNlia Olea MD;  [place] [facility] [place] [facility] [place]  Service: Gynecology    TUBAL ABDOMINAL LIGATION          Social History     Occupational History    Not on file   Tobacco Use    Smoking status: Never     Passive exposure: Never    Smokeless tobacco: Never   Vaping Use    Vaping status: Never Used   Substance and Sexual Activity    Alcohol use: Yes     Alcohol/week: 2.0 standard drinks of alcohol     Types: 2 Glasses of wine per week    Drug use: No    Sexual activity: Yes     Partners: Male     Birth control/protection: Hysterectomy, Surgical, OCP      Social History     Social History Narrative    Not on file        Family History   Problem Relation Age of Onset    Cancer Mother     Anesthesia problems Father     Cancer Father     Cancer Maternal Grandmother     Osteoporosis Maternal Grandmother     Cancer Maternal Grandfather     Cancer Paternal Grandfather     Malig Hyperthermia Neg Hx        ROS: 14 point review of systems was performed and was negative except for documented  findings in HPI and today's encounter.     Allergies: No Known Allergies  Constitutional:  Denies fever, shaking or chills   Eyes:  Denies change in visual acuity   HENT:  Denies nasal congestion or sore throat   Respiratory:  Denies cough or shortness of breath   Cardiovascular:  Denies chest pain or severe LE edema   GI:  Denies abdominal pain, nausea, vomiting, bloody stools or diarrhea   Musculoskeletal:  Numbness, tingling, or loss of motor function only as noted above in history of present illness.  : Denies painful urination or hematuria  Integument:  Denies rash, lesion or ulceration   Neurologic:  Denies headache or focal weakness  Endocrine:  Denies lymphadenopathy  Psych:  Denies confusion or change in mental status   Hem:  Denies active bleeding      Physical Exam: 54 y.o. female  Wt Readings from Last 3 Encounters:   04/28/25 91.6 kg (202 lb)   03/24/25 92 kg (202 lb 12.8 oz)   03/07/25 86.2 kg (190 lb)       There is no height or weight on file to calculate BMI.    There were no vitals filed for this visit.  Vital signs reviewed.   General Appearance:    Alert, cooperative, in no acute distress                    Ortho exam      Exam is unchanged           Assessment: Right knee PRP injection Sridevi margy the blood we spun it down and I injected it sterilely without difficulty    Plan:   Follow up as indicated.  Ice, elevate, and rest as needed.  Discussed conservative measures of pain control including ice, bracing.  Also talked about the importance of strengthening and maintaining ideal body weight  Large Joint Arthrocentesis: R knee  Date/Time: 5/19/2025 1:26 PM  Consent given by: patient  Site marked: site marked  Timeout: Immediately prior to procedure a time out was called to verify the correct patient, procedure, equipment, support staff and site/side marked as required   Supporting Documentation  Indications: pain   Procedure Details  Location: knee - R knee  Preparation: Patient was prepped  and draped in the usual sterile fashion  Needle gauge: 21G.  Approach: anteromedial  Patient tolerance: patient tolerated the procedure well with no immediate complications      Grace Alaniz M.D.

## 2025-05-14 ENCOUNTER — HOSPITAL ENCOUNTER (OUTPATIENT)
Dept: PHYSICAL THERAPY | Facility: HOSPITAL | Age: 55
Setting detail: THERAPIES SERIES
Discharge: HOME | End: 2025-05-14
Payer: COMMERCIAL

## 2025-05-14 DIAGNOSIS — S83.241A OTHER TEAR OF MEDIAL MENISCUS OF RIGHT KNEE AS CURRENT INJURY, INITIAL ENCOUNTER: Primary | ICD-10-CM

## 2025-05-14 PROCEDURE — 97110 THERAPEUTIC EXERCISES: CPT | Performed by: PHYSICAL THERAPIST

## 2025-05-14 NOTE — THERAPY TREATMENT NOTE
Outpatient Physical Therapy Ortho Treatment Note   Annette     Patient Name: Celsa Tom  : 1970  MRN: [id number]  Today's Date: 2025      Visit Date: 2025    Visit Dx:    ICD-10-CM ICD-9-CM   1. Other tear of medial meniscus of right knee as current injury, initial encounter  S83.241A 836.0       Patient Active Problem List   Diagnosis    Uterine adenomyoma    Osteoarthritis of patellofemoral joints of both knees    Mechanical knee pain, right    Tear of lateral meniscus of right knee    Primary osteoarthritis of right knee    Primary osteoarthritis of left knee    Spider veins    Greater trochanteric bursitis of left hip        Past Medical History:   Diagnosis Date    Ankle pain, left     Ankle sprain 1987    Arthritis of back 2018    Cervical disc disorder 2018    Dermatitis, unspecified 2022    Greater trochanteric bursitis of left hip 2024    Heavy menstrual bleeding     History of mammogram 2020    Knee swelling 2018    Localized edema 2022    Low back strain 2018    Migraine     Nontoxic goiter, unspecified 2022    Osteoarthritis of knee 2020    PONV (postoperative nausea and vomiting)     Sciatica of right side 2020    Spider veins     Spinal headache     Tear of meniscus of knee 2018    Tendonitis     left ankle    Varicose veins of bilateral lower extremities with pain     And inflammation    Wears contact lenses         Past Surgical History:   Procedure Laterality Date     SECTION      x2    COLONOSCOPY  2019    TOTAL LAPAROSCOPIC HYSTERECTOMY Bilateral 2018    Procedure: TOTAL LAPAROSCOPIC HYSTERECTOMY BILATERAL SALPINGECTOMY;  Surgeon: Nelia Olea MD;  [place] [facility] [place] [facility] [place]: [place]necology    TUBAL ABDOMINAL LIGATION          PT Ortho       Row Name 25 0830       MMT Right Lower Ext    Rt Hip Flexion MMT, Gross Movement (4+/5) good plus  -GC    Rt Hip Extension MMT, Gross Movement (5/5) normal  -GC     Rt Hip ABduction MMT, Gross Movement (5/5) normal  -GC    Rt Hip ADduction MMT, Gross Movement (5/5) normal  -GC    Rt Knee Extension MMT, Gross Movement (5/5) normal  -GC       MMT Left Lower Ext    Lt Hip Flexion MMT, Gross Movement (5/5) normal  -GC    Lt Hip Extension MMT, Gross Movement (5/5) normal  -GC    Lt Hip ABduction MMT, Gross Movement (5/5) normal  -GC    Lt Hip ADduction MMT, Gross Movement (5/5) normal  -GC    Lt Knee Extension MMT, Gross Movement (5/5) normal  -GC              User Key  (r) = Recorded By, (t) = Taken By, (c) = Cosigned By      Initials Name Provider Type     Chnadan Larsen, PT Physical Therapist                                 PT Assessment/Plan       Row Name 05/14/25 0830          PT Assessment    Assessment Comments Pt is doing well with increased strength noted.  -GC        PT Plan    PT Plan Comments Pt is to continue her HEP daily. She has a PRP injection next week. Will follow up as indicated.  -               User Key  (r) = Recorded By, (t) = Taken By, (c) = Cosigned By      Initials Name Provider Type     Chandan Larsen, PT Physical Therapist                       OP Exercises       Row Name 05/14/25 0830             Subjective    Subjective Comments Pt states she did have a couple days of swelling after increasing the weights last visit.  -GC         Exercise 1    Exercise Name 1 Hamstring stretch-bilateral  -GC      Reps 1 15  -GC      Time 1 10 secs  -GC         Exercise 2    Exercise Name 2 QS with Hungarian Stim-bilateral  -GC      Time 2 10 min 10/10  -GC         Exercise 3    Exercise Name 3 SLR-bilateral  -GC      Reps 3 25  -GC      Time 3 3#  -GC         Exercise 4    Exercise Name 4 Supine clam shell vs theraband  -GC      Reps 4 25  -GC      Time 4 gold  -GC         Exercise 5    Exercise Name 5 bridge vs theraband  -GC      Reps 5 25  -GC      Time 5 gold  -GC         Exercise 6    Exercise Name 6 bridge with ball squeeze  -GC      Reps 6 25  -GC          Exercise 7    Exercise Name 7 SAQ/ball squeeze  -GC      Reps 7 50  -GC      Time 7 4#  -GC         Exercise 8    Exercise Name 8 LAQ/ball squeeze  -GC      Reps 8 50  -GC      Time 8 4#  -GC         Exercise 9    Exercise Name 9 TKE vs theraband  -GC      Reps 9 50  -GC      Time 9 gold  -GC                User Key  (r) = Recorded By, (t) = Taken By, (c) = Cosigned By      Initials Name Provider Type     Chandan Larsen, PT Physical Therapist                                                    Time Calculation:   Start Time: 0830  Stop Time: 0912  Time Calculation (min): 42 min  Therapy Charges for Today       Code Description Service Date Service Provider Modifiers Qty    45217849763 HC PT THER PROC EA 15 MIN 5/14/2025 Chandan Larsen, PT GP 2                      Chandan Larsen, PT  5/14/2025

## 2025-05-19 ENCOUNTER — CLINICAL SUPPORT (OUTPATIENT)
Dept: ORTHOPEDIC SURGERY | Facility: CLINIC | Age: 55
End: 2025-05-19

## 2025-05-19 VITALS — WEIGHT: 202.9 LBS | TEMPERATURE: 98.6 F | BODY MASS INDEX: 30.05 KG/M2 | HEIGHT: 69 IN

## 2025-05-19 DIAGNOSIS — M17.11 PRIMARY OSTEOARTHRITIS OF RIGHT KNEE: Primary | ICD-10-CM

## 2025-05-19 RX ORDER — PROGESTERONE 200 MG/1
200 CAPSULE ORAL
COMMUNITY
Start: 2025-05-11

## 2025-05-19 RX ORDER — HYDROCORTISONE ACETATE PRAMOXINE HCL 2.5; 1 G/100G; G/100G
CREAM TOPICAL
COMMUNITY
Start: 2025-05-12

## 2025-05-19 RX ORDER — ESTRADIOL 0.07 MG/D
FILM, EXTENDED RELEASE TRANSDERMAL
COMMUNITY
Start: 2025-05-13

## 2025-05-19 RX ORDER — ESTRADIOL 10 UG/1
INSERT VAGINAL
COMMUNITY
Start: 2025-05-12

## 2025-05-19 RX ORDER — TRETINOIN 1 MG/G
CREAM TOPICAL
COMMUNITY
Start: 2025-03-10

## 2025-06-20 ENCOUNTER — HOSPITAL ENCOUNTER (OUTPATIENT)
Dept: PHYSICAL THERAPY | Facility: HOSPITAL | Age: 55
Setting detail: THERAPIES SERIES
Discharge: HOME | End: 2025-06-20
Payer: COMMERCIAL

## 2025-06-20 DIAGNOSIS — S83.241A OTHER TEAR OF MEDIAL MENISCUS OF RIGHT KNEE AS CURRENT INJURY, INITIAL ENCOUNTER: Primary | ICD-10-CM

## 2025-06-20 NOTE — THERAPY TREATMENT NOTE
Outpatient Physical Therapy Ortho Progress Note   Annette     Patient Name: Celsa Tom  : 1970  MRN: [id number]  Today's Date: 2025      Visit Date: 2025    Visit Dx:    ICD-10-CM ICD-9-CM   1. Other tear of medial meniscus of right knee as current injury, initial encounter  S83.241A 836.0       Patient Active Problem List   Diagnosis    Uterine adenomyoma    Osteoarthritis of patellofemoral joints of both knees    Mechanical knee pain, right    Tear of lateral meniscus of right knee    Primary osteoarthritis of right knee    Primary osteoarthritis of left knee    Spider veins    Greater trochanteric bursitis of left hip        Past Medical History:   Diagnosis Date    Ankle pain, left     Ankle sprain 1987    Arthritis of back 2018    Cervical disc disorder 2018    Dermatitis, unspecified 2022    Greater trochanteric bursitis of left hip 2024    Heavy menstrual bleeding     History of mammogram 2020    Knee swelling 2018    Localized edema 2022    Low back strain 2018    Migraine     Nontoxic goiter, unspecified 2022    Osteoarthritis of knee 2020    PONV (postoperative nausea and vomiting)     Sciatica of right side 2020    Spider veins     Spinal headache     Tear of meniscus of knee 2018    Tendonitis     left ankle    Varicose veins of bilateral lower extremities with pain     And inflammation    Wears contact lenses         Past Surgical History:   Procedure Laterality Date     SECTION      x2    COLONOSCOPY  2019    TOTAL LAPAROSCOPIC HYSTERECTOMY Bilateral 2018    Procedure: TOTAL LAPAROSCOPIC HYSTERECTOMY BILATERAL SALPINGECTOMY;  Surgeon: Nelia Olea MD;  [place] [facility] [place] [facility] [place]  Service: Gynecology    TUBAL ABDOMINAL LIGATION          PT Ortho       Row Name 25 0900       Subjective    Subjective Comments Pt states she is now a month from her PRP shot and she has not seen any real benefit. She still c/o pain  under her kneecap and across the inside of her knee.  -GC       Knee Palpation    Patella Right:;Tender  medial facet  -GC    Medial Joint Line Right:;Tender  -GC       Patellar Accessory Motions    Superior glide Right:;WNL  -GC    Inferior glide Right:;WNL  -GC    Medial glide Right:;WNL  -GC    Lateral glide Right:;WNL  -GC       Knee Special Tests    Mackenzie’s test (meniscal lesion) Right:;Positive  -GC    Patellar grind test (chondromalacia patella) Right:;Positive  -GC       Right Lower Ext    Rt Knee Extension/Flexion AROM 0-130 degrees  -GC       MMT Right Lower Ext    Rt Hip Flexion MMT, Gross Movement (5/5) normal  -GC    Rt Hip Extension MMT, Gross Movement (5/5) normal  -GC    Rt Hip ABduction MMT, Gross Movement (5/5) normal  -GC    Rt Hip ADduction MMT, Gross Movement (5/5) normal  -GC    Rt Knee Extension MMT, Gross Movement (5/5) normal  -GC    Rt Knee Flexion MMT, Gross Movement (5/5) normal  -GC       Lower Extremity Flexibility    Hamstrings Right:;WNL  -GC              User Key  (r) = Recorded By, (t) = Taken By, (c) = Cosigned By      Initials Name Provider Type     Chandan Larsen, PT Physical Therapist                                 PT Assessment/Plan       Row Name 06/20/25 0900          PT Assessment    Assessment Comments Pt has good knee ROM, strength, and flexibility. Pain continues to be her primary limiting factor. Feel this is more arthritic in nature.  -        PT Plan    PT Plan Comments Pt is to continue her HEP and will call MD to follow up.  -               User Key  (r) = Recorded By, (t) = Taken By, (c) = Cosigned By      Initials Name Provider Type     Chandan Larsen PT Physical Therapist                       OP Exercises       Row Name 06/20/25 0900             Subjective    Subjective Comments Pt states she is now a month from her PRP shot and she has not seen any real benefit. She still c/o pain under her kneecap and across the inside of her knee.  -                 User Key  (r) = Recorded By, (t) = Taken By, (c) = Cosigned By      Initials Name Provider Type    Chandan Strong, PT Physical Therapist                                                    Time Calculation:                    Chandan Larsen PT  6/20/2025

## 2025-07-01 NOTE — PROGRESS NOTES
Patient: Celsa Tom  YOB: 1970  Date of Service: 7/1/2025    Chief Complaints: Right knee pain    Subjective:  Right knee pain  History of Present Illness: Pt is seen in the office today with complaints of She does have an MRI from 2023 which shows significant degenerative changes in all 3 compartments she has some fraying of the lateral meniscus but pretty significant arthritis 2 years ago.  We have been treating this conservatively.  She did the gel and did not get much relief it has been a while since she has had steroid but that PRP did help some        Allergies: No Known Allergies    Medications:   Home Medications:  Current Outpatient Medications on File Prior to Visit   Medication Sig    BIOTIN PO Take  by mouth.    Cholecalciferol (VITAMIN D3 PO) Take  by mouth.    Cobalamin Combinations (B12 Folate) 800-800 MCG capsule Take  by mouth.    cyclobenzaprine (FLEXERIL) 10 MG tablet Take 1 tablet by mouth At Night As Needed for Muscle Spasms.    estradiol (MINIVELLE, VIVELLE-DOT) 0.075 MG/24HR patch PLACE 1 PATCH ON SKIN TWICE A WEEK    FOLIC ACID PO Take  by mouth.    Hydrocort-Pramoxine, Perianal, (ANALPRAM-HC) 2.5-1 % rectal cream APPLY 1 GRAM RECTALLY TO THE AFFECTED AREA TWICE DAILY    Multiple Vitamin (MULTIVITAMIN) capsule Take 1 capsule by mouth Daily. Stopped preop, last dose 6/5/18    naproxen sodium (ALEVE) 220 MG tablet Take 1 tablet by mouth 2 (Two) Times a Day As Needed.    Omega-3 Fatty Acids (FISH OIL) 1000 MG capsule capsule Take  by mouth.    Probiotic Product (PROBIOTIC DAILY PO) Take 1 tablet by mouth Daily. Stopped preop, last dose 6/5/18    Progesterone (PROMETRIUM) 200 MG capsule Take 1 capsule by mouth every night at bedtime.    tretinoin (RETIN-A) 0.1 % cream     Vagifem 10 MCG tablet vaginal tablet INSERT 1 TABLET INTRAVAGINALLY 2 TIMES A WEEK    valACYclovir (VALTREX) 500 MG tablet Take 1 tablet by mouth Daily.     No current facility-administered medications on  file prior to visit.     Current Medications:  Scheduled Meds:  Continuous Infusions:No current facility-administered medications for this visit.    PRN Meds:.    I have reviewed the patient's medical history in detail and updated the computerized patient record.  Review and summarization of old records include:    Past Medical History:   Diagnosis Date    Ankle pain, left     Ankle sprain 1987    Arthritis of back 2018    Cervical disc disorder 2018    Dermatitis, unspecified 2022    Greater trochanteric bursitis of left hip 2024    Heavy menstrual bleeding     History of mammogram 2020    Knee swelling 2018    Localized edema 2022    Low back strain 2018    Migraine     Nontoxic goiter, unspecified 2022    Osteoarthritis of knee 2020    PONV (postoperative nausea and vomiting)     Sciatica of right side 2020    Spider veins     Spinal headache     Tear of meniscus of knee 2018    Tendonitis     left ankle    Varicose veins of bilateral lower extremities with pain     And inflammation    Wears contact lenses         Past Surgical History:   Procedure Laterality Date     SECTION      x2    COLONOSCOPY  2019    TOTAL LAPAROSCOPIC HYSTERECTOMY Bilateral 2018    Procedure: TOTAL LAPAROSCOPIC HYSTERECTOMY BILATERAL SALPINGECTOMY;  Surgeon: Nelia Olea MD;  [place] [facility] [place] [facility] [place]  Service: Gynecology    TUBAL ABDOMINAL LIGATION          Social History     Occupational History    Not on file   Tobacco Use    Smoking status: Never     Passive exposure: Never    Smokeless tobacco: Never   Vaping Use    Vaping status: Never Used   Substance and Sexual Activity    Alcohol use: Yes     Alcohol/week: 2.0 standard drinks of alcohol     Types: 2 Glasses of wine per week    Drug use: No    Sexual activity: Yes     Partners: Male     Birth control/protection: Hysterectomy, Surgical, OCP      Social History     Social History Narrative    Not on file        Family History    Problem Relation Age of Onset    Cancer Mother     Anesthesia problems Father     Cancer Father     Cancer Maternal Grandmother     Osteoporosis Maternal Grandmother     Cancer Maternal Grandfather     Cancer Paternal Grandfather     Malig Hyperthermia Neg Hx        ROS: 14 point review of systems was performed and was negative except for documented findings in HPI and today's encounter.     Allergies: No Known Allergies  Constitutional:  Denies fever, shaking or chills   Eyes:  Denies change in visual acuity   HENT:  Denies nasal congestion or sore throat   Respiratory:  Denies cough or shortness of breath   Cardiovascular:  Denies chest pain or severe LE edema   GI:  Denies abdominal pain, nausea, vomiting, bloody stools or diarrhea   Musculoskeletal:  Numbness, tingling, or loss of motor function only as noted above in history of present illness.  : Denies painful urination or hematuria  Integument:  Denies rash, lesion or ulceration   Neurologic:  Denies headache or focal weakness  Endocrine:  Denies lymphadenopathy  Psych:  Denies confusion or change in mental status   Hem:  Denies active bleeding      Physical Exam: 54 y.o. female  Wt Readings from Last 3 Encounters:   05/19/25 92 kg (202 lb 14.4 oz)   04/28/25 91.6 kg (202 lb)   03/24/25 92 kg (202 lb 12.8 oz)       There is no height or weight on file to calculate BMI.    There were no vitals filed for this visit.  Vital signs reviewed.   General Appearance:    Alert, cooperative, in no acute distress                    Ortho exam    Physical exam of the right knee reveals no effusion, no erythema.  The patient has no palpable tenderness along the medial joint line, no tenderness about the lateral joint line.  Patient does have crepitus with patellofemoral range of motion.  They also have subjective symptoms anteriorly during exam.  The patient has a negative bounce home, negative Mackenzie and a stable ligamentous exam.  Quad tone is reasonable and  symmetric.  There are no overlying skin changes no lymphedema no lymphadenopathy.  There is good hip range of motion which is full symmetric and asymptomatic and a normal ankle exam.  Hamstrings and IT band are tight bilaterally.                Assessment: Right knee pain I think this is mostly arthritis she has tricompartmental OA most significant seen at patellofemoral.  A lot of her symptoms today seem more patellofemoral plan is to proceed with an injection and Sharad continues to aggressive with stretching and strengthening weight management we did fit her with a J brace today which she liked she understands her options going forward.  I do not think she is great candidate for arthroscopy we will continue conservative management at this time    Plan:   Follow up as indicated.  Ice, elevate, and rest as needed.  Discussed conservative measures of pain control including ice, bracing.  Also talked about the importance of strengthening and maintaining ideal body weight    Large Joint Arthrocentesis: R knee  Date/Time: 7/17/2025 9:01 AM  Consent given by: patient  Site marked: site marked  Timeout: Immediately prior to procedure a time out was called to verify the correct patient, procedure, equipment, support staff and site/side marked as required   Supporting Documentation  Indications: pain   Procedure Details  Location: knee - R knee  Preparation: Patient was prepped and draped in the usual sterile fashion  Needle gauge: 21G.  Approach: medial  Medications administered: 80 mg methylPREDNISolone acetate 80 MG/ML; 2 mL lidocaine PF 1% 1 %  Patient tolerance: patient tolerated the procedure well with no immediate complications       Grace Alaniz M.D.

## 2025-07-17 ENCOUNTER — OFFICE VISIT (OUTPATIENT)
Dept: ORTHOPEDIC SURGERY | Facility: CLINIC | Age: 55
End: 2025-07-17
Payer: COMMERCIAL

## 2025-07-17 VITALS — BODY MASS INDEX: 31.01 KG/M2 | TEMPERATURE: 98.2 F | HEIGHT: 68 IN | WEIGHT: 204.6 LBS

## 2025-07-17 DIAGNOSIS — M17.10 PATELLOFEMORAL ARTHRITIS: Primary | ICD-10-CM

## 2025-07-17 RX ORDER — METHYLPREDNISOLONE ACETATE 80 MG/ML
80 INJECTION, SUSPENSION INTRA-ARTICULAR; INTRALESIONAL; INTRAMUSCULAR; SOFT TISSUE
Status: COMPLETED | OUTPATIENT
Start: 2025-07-17 | End: 2025-07-17

## 2025-07-17 RX ORDER — LIDOCAINE HYDROCHLORIDE 10 MG/ML
2 INJECTION, SOLUTION EPIDURAL; INFILTRATION; INTRACAUDAL; PERINEURAL
Status: COMPLETED | OUTPATIENT
Start: 2025-07-17 | End: 2025-07-17

## 2025-07-17 RX ADMIN — LIDOCAINE HYDROCHLORIDE 2 ML: 10 INJECTION, SOLUTION EPIDURAL; INFILTRATION; INTRACAUDAL; PERINEURAL at 09:01

## 2025-07-17 RX ADMIN — METHYLPREDNISOLONE ACETATE 80 MG: 80 INJECTION, SUSPENSION INTRA-ARTICULAR; INTRALESIONAL; INTRAMUSCULAR; SOFT TISSUE at 09:01

## (undated) DEVICE — ENDOCUT SCISSOR TIP, DISPOSABLE: Brand: RENEW

## (undated) DEVICE — 3M™ STERI-STRIP™ REINFORCED ADHESIVE SKIN CLOSURES, R1546, 1/4 IN X 4 IN (6 MM X 100 MM), 10 STRIPS/ENVELOPE: Brand: 3M™ STERI-STRIP™

## (undated) DEVICE — SYR LUERLOK 20CC

## (undated) DEVICE — COVER,MAYO STAND,STERILE: Brand: MEDLINE

## (undated) DEVICE — CONMED GOLDLINE ELECTROSURGICAL HANDPIECE, HAND CONTROLLED WITH ULTRACLEAN BLADE ELECTRODE, BUTTON SWITCH, SAFETY HOLSTER AND 10' (3 M) CABLE: Brand: CONMED GOLDLINE

## (undated) DEVICE — KTTNER ENDO BLNT DISSCT

## (undated) DEVICE — 3M™ STERI-STRIP™ COMPOUND BENZOIN TINCTURE 40 BAGS/CARTON 4 CARTONS/CASE C1544: Brand: 3M™ STERI-STRIP™

## (undated) DEVICE — ENDOPATH XCEL BLADELESS TROCARS WITH STABILITY SLEEVES: Brand: ENDOPATH XCEL

## (undated) DEVICE — 40585 XL ADVANCED TRENDELENBURG POSITIONING KIT: Brand: 40585 XL ADVANCED TRENDELENBURG POSITIONING KIT

## (undated) DEVICE — LAPAROSCOPIC SMOKE ELIMINATION DEVICE: Brand: PNEUVIEW XE

## (undated) DEVICE — ENDOPATH XCEL UNIVERSAL TROCAR STABLILITY SLEEVES: Brand: ENDOPATH XCEL

## (undated) DEVICE — 100% SILICONE FOLEY TRAY,16 FR/CH (5.3 MM), 5 ML CATHETER PRE-CONNECTED TO 2000 ML DRAINAGE BAG WITH LUER-LOCK SAMPLING AND ADHESIVE CATHETER SECUREMENT: Brand: DOVER

## (undated) DEVICE — VISUALIZATION SYSTEM: Brand: CLEARIFY

## (undated) DEVICE — 1 ML TUBERCULIN SYRINGE REGULAR TIP: Brand: MONOJECT

## (undated) DEVICE — GLV SURG BIOGEL LTX PF 6

## (undated) DEVICE — SUT MNCRYL 4/0 PS2 18 IN

## (undated) DEVICE — HARMONIC ACE +7 LAPAROSCOPIC SHEARS ADVANCED HEMOSTASIS 5MM DIAMETER 36CM SHAFT LENGTH  FOR USE WITH GRAY HAND PIECE ONLY: Brand: HARMONIC ACE

## (undated) DEVICE — IRRIGATOR BULB ASEPTO 60CC STRL

## (undated) DEVICE — SUT VIC 0 CT1 36IN J946H

## (undated) DEVICE — SUT VIC 0 TIES 18IN J912G

## (undated) DEVICE — SUT VIC 0 TN 27IN DYED JTN0G

## (undated) DEVICE — PK LAP GYN TOWER 40

## (undated) DEVICE — 2, DISPOSABLE SUCTION/IRRIGATOR WITH DISPOSABLE TIP: Brand: STRYKEFLOW

## (undated) DEVICE — LAPAROSCOPIC GAS CONDITIONING DEVICE.: Brand: INSUFLOW

## (undated) DEVICE — GLV SURG TRIUMPH CLASSIC PF LTX 6.5 STRL

## (undated) DEVICE — NDL SPINE 22G 31/2IN BLK